# Patient Record
Sex: FEMALE | Race: WHITE | NOT HISPANIC OR LATINO | URBAN - METROPOLITAN AREA
[De-identification: names, ages, dates, MRNs, and addresses within clinical notes are randomized per-mention and may not be internally consistent; named-entity substitution may affect disease eponyms.]

---

## 2022-02-01 ENCOUNTER — OFFICE VISIT (OUTPATIENT)
Dept: URBAN - METROPOLITAN AREA CLINIC 68 | Facility: CLINIC | Age: 77
End: 2022-02-01

## 2022-06-04 ENCOUNTER — TELEPHONE ENCOUNTER (OUTPATIENT)
Dept: URBAN - METROPOLITAN AREA CLINIC 68 | Facility: CLINIC | Age: 77
End: 2022-06-04

## 2022-06-04 RX ORDER — GABAPENTIN 100 MG/1
NEURONTIN(    AS DIRECTED ) INACTIVE -HX ENTRY CAPSULE ORAL AS DIRECTED
OUTPATIENT
Start: 2008-03-18

## 2022-06-04 RX ORDER — AMITRIPTYLINE HYDROCHLORIDE 25 MG/1
AMITRIPTYLINE HCL(    QHS ) INACTIVE -HX ENTRY TABLET, FILM COATED ORAL QHS
OUTPATIENT
Start: 2008-04-03

## 2022-06-04 RX ORDER — GABAPENTIN 100 MG/1
NEURONTIN(    AS DIRECTED ) INACTIVE -HX ENTRY CAPSULE ORAL AS DIRECTED
OUTPATIENT
Start: 2008-04-16

## 2022-06-04 RX ORDER — GABAPENTIN 300 MG/1
CAPSULE ORAL AS DIRECTED
OUTPATIENT
Start: 2011-08-16 | End: 2018-03-16

## 2022-06-04 RX ORDER — GABAPENTIN 600 MG/1
NEURONTIN(    ONE TABLET AT BEDTIME ) INACTIVE -HX ENTRY TABLET, FILM COATED ORAL
OUTPATIENT
Start: 2009-03-31

## 2022-06-04 RX ORDER — CYCLOSPORINE 0.5 MG/ML
EMULSION OPHTHALMIC AS DIRECTED
OUTPATIENT
Start: 2009-12-15 | End: 2018-03-16

## 2022-06-04 RX ORDER — CLONAZEPAM 0.5 MG/1
KLONOPIN(    1/2 BID ) INACTIVE -HX ENTRY TABLET ORAL
OUTPATIENT
Start: 2005-03-16

## 2022-06-04 RX ORDER — GABAPENTIN 300 MG/1
NEURONTIN(    AS DIRECTED ) INACTIVE -HX ENTRY CAPSULE ORAL AS DIRECTED
OUTPATIENT
Start: 2009-12-15

## 2022-06-04 RX ORDER — CLONAZEPAM 0.5 MG/1
KLONOPIN(    1 AT BEDTIME ) INACTIVE -HX ENTRY TABLET ORAL
OUTPATIENT
Start: 2009-03-31

## 2022-06-04 RX ORDER — HYDROCORTISONE ACETATE 25 MG/1
SUPPOSITORY RECTAL
Qty: 28 | Refills: 0 | OUTPATIENT
Start: 2015-08-07 | End: 2015-08-17

## 2022-06-04 RX ORDER — SODIUM PICOSULFATE, MAGNESIUM OXIDE, AND ANHYDROUS CITRIC ACID 10; 3.5; 12 MG/16.2G; G/16.2G; G/16.2G
POWDER, METERED ORAL
Qty: 2 | Refills: 0 | OUTPATIENT
Start: 2016-09-01 | End: 2016-09-02

## 2022-06-04 RX ORDER — HYOSCYAMINE SULFATE 0.12 MG/1
TABLET ORAL
Qty: 60 | Refills: 60 | OUTPATIENT
Start: 2016-09-01 | End: 2018-03-16

## 2022-06-04 RX ORDER — POLYETHYLENE GLYCOL 3350, SODIUM SULFATE, SODIUM CHLORIDE, POTASSIUM CHLORIDE, ASCORBIC ACID, SODIUM ASCORBATE 7.5-2.691G
KIT ORAL
Qty: 1 | Refills: 0 | OUTPATIENT
Start: 2013-12-27 | End: 2018-03-16

## 2022-06-04 RX ORDER — CLOTRIMAZOLE 1 G/100G
CREAM TOPICAL
Qty: 1 | Refills: 0 | OUTPATIENT
Start: 2018-03-16 | End: 2018-03-26

## 2022-06-04 RX ORDER — DICYCLOMINE HYDROCHLORIDE 10 MG/1
CAPSULE ORAL
Qty: 60 | Refills: 0 | OUTPATIENT
Start: 2013-12-17 | End: 2014-01-01

## 2022-06-04 RX ORDER — BALSALAZIDE DISODIUM 750 MG/1
COLAZAL(    3 CAPSULES 3 TIMES DAILY ) INACTIVE -HX ENTRY CAPSULE ORAL
OUTPATIENT
Start: 2005-08-19

## 2022-06-04 RX ORDER — VENLAFAXINE HCL 37.5 MG
EFFEXOR XR(    1 CAPSULE DAILY WITH FOOD ) INACTIVE -HX ENTRY CAPSULE, EXT RELEASE 24 HR ORAL
OUTPATIENT
Start: 2005-03-16

## 2022-06-04 RX ORDER — HYDROCORTISONE ACETATE AND PRAMOXINE HYDROCHLORIDE 10; 10 MG/G; MG/G
CREAM TOPICAL
OUTPATIENT
Start: 2011-06-03 | End: 2013-02-05

## 2022-06-04 RX ORDER — BALSALAZIDE DISODIUM 750 MG/1
COLAZAL(    3 CAPSULES 3 TIMES DAILY ) INACTIVE -HX ENTRY CAPSULE ORAL
OUTPATIENT
Start: 2005-02-17

## 2022-06-05 ENCOUNTER — TELEPHONE ENCOUNTER (OUTPATIENT)
Dept: URBAN - METROPOLITAN AREA CLINIC 68 | Facility: CLINIC | Age: 77
End: 2022-06-05

## 2022-06-05 RX ORDER — COLESEVELAM HYDROCHLORIDE 625 MG/1
TABLET, FILM COATED ORAL DAILY
Qty: 30 | Refills: 30 | Status: ACTIVE | COMMUNITY
Start: 2018-04-19

## 2022-06-05 RX ORDER — CLONAZEPAM 1 MG/1
TABLET ORAL AS NEEDED
Status: ACTIVE | COMMUNITY
Start: 2009-12-15

## 2022-06-05 RX ORDER — RIFAXIMIN 550 MG/1
TABLET ORAL
Qty: 42 | Refills: 42 | Status: ACTIVE | COMMUNITY
Start: 2018-05-29

## 2022-06-25 ENCOUNTER — TELEPHONE ENCOUNTER (OUTPATIENT)
Age: 77
End: 2022-06-25

## 2022-06-25 RX ORDER — DICYCLOMINE HYDROCHLORIDE 20 MG/2ML
BENTYL(    1 CAPSULE 3 TIMES DAILY ) INACTIVE -HX ENTRY INJECTION, SOLUTION INTRAMUSCULAR
OUTPATIENT
Start: 2005-02-17

## 2022-06-25 RX ORDER — GABAPENTIN 100 MG
NEURONTIN(    AS DIRECTED ) INACTIVE -HX ENTRY CAPSULE ORAL AS DIRECTED
OUTPATIENT
Start: 2008-03-18

## 2022-06-25 RX ORDER — HYDROCORTISONE ACETATE 25 MG/1
SUPPOSITORY RECTAL
Qty: 28 | Refills: 0 | OUTPATIENT
Start: 2015-08-07 | End: 2015-08-17

## 2022-06-25 RX ORDER — DICYCLOMINE HYDROCHLORIDE 10 MG/1
CAPSULE ORAL
Qty: 60 | Refills: 0 | OUTPATIENT
Start: 2013-12-17 | End: 2014-01-01

## 2022-06-25 RX ORDER — CLONAZEPAM 0.5 MG/1
KLONOPIN(    1/2 BID ) INACTIVE -HX ENTRY TABLET ORAL
OUTPATIENT
Start: 2005-03-16

## 2022-06-25 RX ORDER — HYDROCORTISONE ACETATE AND PRAMOXINE HYDROCHLORIDE 25; 10 MG/G; MG/G
ANALPRAM-HC(    APPLY TO RECTUM BID ) INACTIVE -HX ENTRY CREAM TOPICAL
OUTPATIENT
Start: 2008-04-03

## 2022-06-25 RX ORDER — HYDROCORTISONE ACETATE AND PRAMOXINE HYDROCHLORIDE 25; 10 MG/G; MG/G
ANALPRAM-HC(    APPLY TO RECTUM BID ) INACTIVE -HX ENTRY CREAM TOPICAL
OUTPATIENT
Start: 2009-03-31

## 2022-06-25 RX ORDER — MESALAMINE 800 MG/1
ASACOL(    AS DIRECTED ) INACTIVE -HX ENTRY TABLET, DELAYED RELEASE ORAL AS DIRECTED
OUTPATIENT
Start: 2005-02-08

## 2022-06-25 RX ORDER — CLONAZEPAM 0.5 MG/1
KLONOPIN(    1 AT BEDTIME ) INACTIVE -HX ENTRY TABLET ORAL
OUTPATIENT
Start: 2009-03-31

## 2022-06-25 RX ORDER — BALSALAZIDE DISODIUM 750 MG/1
COLAZAL(    3 CAPSULES 3 TIMES DAILY ) INACTIVE -HX ENTRY CAPSULE ORAL
OUTPATIENT
Start: 2005-08-19

## 2022-06-25 RX ORDER — POLYETHYLENE GLYCOL 3350, SODIUM SULFATE, SODIUM CHLORIDE, POTASSIUM CHLORIDE, ASCORBIC ACID, SODIUM ASCORBATE 7.5-2.691G
KIT ORAL
Qty: 1 | Refills: 0 | OUTPATIENT
Start: 2013-12-27 | End: 2018-03-16

## 2022-06-25 RX ORDER — BALSALAZIDE DISODIUM 750 MG/1
COLAZAL(    3 CAPSULES 3 TIMES DAILY ) INACTIVE -HX ENTRY CAPSULE ORAL
OUTPATIENT
Start: 2005-02-17

## 2022-06-25 RX ORDER — DIPHENOXYLATE HCL/ATROPINE 2.5-.025MG
LOMOTIL(    AS DIRECTED ) INACTIVE -HX ENTRY TABLET ORAL AS DIRECTED
OUTPATIENT
Start: 2009-03-31

## 2022-06-25 RX ORDER — DICYCLOMINE HYDROCHLORIDE 20 MG/2ML
BENTYL(    1 PO Q4-6 HOURS PRN PAIN ) INACTIVE -HX ENTRY INJECTION, SOLUTION INTRAMUSCULAR
OUTPATIENT
Start: 2008-04-16

## 2022-06-25 RX ORDER — GABAPENTIN 300 MG
NEURONTIN(    AS DIRECTED ) INACTIVE -HX ENTRY CAPSULE ORAL AS DIRECTED
OUTPATIENT
Start: 2009-12-15

## 2022-06-25 RX ORDER — CYCLOSPORINE 0.5 MG/ML
EMULSION OPHTHALMIC AS DIRECTED
OUTPATIENT
Start: 2009-12-15 | End: 2018-03-16

## 2022-06-25 RX ORDER — DIPHENOXYLATE HCL/ATROPINE 2.5-.025MG
LOMOTIL(    AS NEEDED ) INACTIVE -HX ENTRY TABLET ORAL AS NEEDED
OUTPATIENT
Start: 2005-03-16

## 2022-06-25 RX ORDER — SODIUM SULFATE, POTASSIUM SULFATE, MAGNESIUM SULFATE 17.5; 3.13; 1.6 G/ML; G/ML; G/ML
SOLUTION, CONCENTRATE ORAL AS DIRECTED
Qty: 1 | Refills: 0 | OUTPATIENT
Start: 2016-09-01 | End: 2016-09-02

## 2022-06-25 RX ORDER — SODIUM SULFATE, POTASSIUM SULFATE, MAGNESIUM SULFATE 17.5; 3.13; 1.6 G/ML; G/ML; G/ML
SUPREP BOWEL PREP KIT(    AS DIRECTED ) INACTIVE -HX ENTRY SOLUTION, CONCENTRATE ORAL AS DIRECTED
OUTPATIENT
Start: 2011-08-16

## 2022-06-25 RX ORDER — AMITRIPTYLINE HYDROCHLORIDE 25 MG/1
AMITRIPTYLINE HCL(    QHS ) INACTIVE -HX ENTRY TABLET, FILM COATED ORAL QHS
OUTPATIENT
Start: 2008-04-03

## 2022-06-25 RX ORDER — PRAMOXINE HYDROCHLORIDE HYDROCORTISONE ACETATE 100; 100 MG/10G; MG/10G
AEROSOL, FOAM TOPICAL
Qty: 28 | Refills: 28 | OUTPATIENT
Start: 2017-01-27 | End: 2018-03-16

## 2022-06-25 RX ORDER — GABAPENTIN 300 MG
CAPSULE ORAL AS DIRECTED
OUTPATIENT
Start: 2011-08-16 | End: 2018-03-16

## 2022-06-25 RX ORDER — HYOSCYAMINE SULFATE 0.12 MG/1
TABLET ORAL
Qty: 60 | Refills: 60 | OUTPATIENT
Start: 2016-09-01 | End: 2018-03-16

## 2022-06-25 RX ORDER — MESALAMINE 800 MG/1
ASACOL(    2 TABLETS 3 TIMES DAILY ) INACTIVE -HX ENTRY TABLET, DELAYED RELEASE ORAL
OUTPATIENT
Start: 2005-06-20

## 2022-06-25 RX ORDER — HYDROCORTISONE ACETATE 25 MG/1
ANUSOL HC-1(    APPLY TO RECTUM TID ) INACTIVE -HX ENTRY SUPPOSITORY RECTAL
OUTPATIENT
Start: 2005-04-18

## 2022-06-25 RX ORDER — DIPHENOXYLATE HCL/ATROPINE 2.5-.025MG
LOMOTIL(    AS NEEDED ) INACTIVE -HX ENTRY TABLET ORAL AS NEEDED
OUTPATIENT
Start: 2005-02-08

## 2022-06-25 RX ORDER — HYDROCORTISONE ACETATE AND PRAMOXINE HYDROCHLORIDE 10; 10 MG/G; MG/G
CREAM TOPICAL
OUTPATIENT
Start: 2011-06-03 | End: 2013-02-05

## 2022-06-25 RX ORDER — DICYCLOMINE HYDROCHLORIDE 20 MG/2ML
BENTYL(    AS DIRECTED ) INACTIVE -HX ENTRY INJECTION, SOLUTION INTRAMUSCULAR AS DIRECTED
OUTPATIENT
Start: 2005-02-08

## 2022-06-25 RX ORDER — SODIUM SULFATE, POTASSIUM SULFATE, MAGNESIUM SULFATE 17.5; 3.13; 1.6 G/ML; G/ML; G/ML
SOLUTION, CONCENTRATE ORAL AS DIRECTED
Qty: 1 | Refills: 0 | OUTPATIENT
Start: 2017-01-03 | End: 2017-01-04

## 2022-06-25 RX ORDER — VENLAFAXINE HCL 37.5 MG
EFFEXOR XR(    1 CAPSULE DAILY WITH FOOD ) INACTIVE -HX ENTRY CAPSULE, EXT RELEASE 24 HR ORAL
OUTPATIENT
Start: 2005-03-16

## 2022-06-25 RX ORDER — HYDROCORTISONE ACETATE AND PRAMOXINE HYDROCHLORIDE 25; 10 MG/G; MG/G
CREAM TOPICAL
Qty: 120 | Refills: 120 | OUTPATIENT
Start: 2017-02-06 | End: 2018-03-16

## 2022-06-25 RX ORDER — GABAPENTIN 600 MG
NEURONTIN(    ONE TABLET AT BEDTIME ) INACTIVE -HX ENTRY TABLET ORAL
OUTPATIENT
Start: 2009-03-31

## 2022-06-25 RX ORDER — CLOTRIMAZOLE 10 MG/G
CREAM TOPICAL
Qty: 1 | Refills: 0 | OUTPATIENT
Start: 2018-03-16 | End: 2018-03-26

## 2022-06-25 RX ORDER — METRONIDAZOLE 375 MG/1
CAPSULE ORAL
Qty: 30 | Refills: 0 | OUTPATIENT
Start: 2018-03-16 | End: 2018-03-26

## 2022-06-25 RX ORDER — GABAPENTIN 100 MG
NEURONTIN(    AS DIRECTED ) INACTIVE -HX ENTRY CAPSULE ORAL AS DIRECTED
OUTPATIENT
Start: 2008-04-16

## 2022-06-26 ENCOUNTER — TELEPHONE ENCOUNTER (OUTPATIENT)
Age: 77
End: 2022-06-26

## 2022-06-26 RX ORDER — RIFAXIMIN 550 MG/1
TABLET ORAL
Qty: 42 | Refills: 42 | Status: ACTIVE | COMMUNITY
Start: 2018-05-29

## 2022-06-26 RX ORDER — COLESEVELAM HYDROCHLORIDE 625 MG/1
TABLET, FILM COATED ORAL DAILY
Qty: 30 | Refills: 30 | Status: ACTIVE | COMMUNITY
Start: 2018-04-19

## 2022-06-26 RX ORDER — CLONAZEPAM 1 MG/1
TABLET ORAL AS NEEDED
Status: ACTIVE | COMMUNITY
Start: 2009-12-15
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
## 2023-07-27 ENCOUNTER — OFFICE VISIT (OUTPATIENT)
Dept: PRIMARY CARE | Facility: CLINIC | Age: 78
End: 2023-07-27
Payer: MEDICARE

## 2023-07-27 VITALS — TEMPERATURE: 97.9 F | DIASTOLIC BLOOD PRESSURE: 79 MMHG | SYSTOLIC BLOOD PRESSURE: 140 MMHG | HEART RATE: 71 BPM

## 2023-07-27 DIAGNOSIS — F10.21 HISTORY OF ALCOHOLISM (MULTI): ICD-10-CM

## 2023-07-27 DIAGNOSIS — Z12.31 ENCOUNTER FOR SCREENING MAMMOGRAM FOR MALIGNANT NEOPLASM OF BREAST: ICD-10-CM

## 2023-07-27 DIAGNOSIS — Z85.820 H/O MALIGNANT MELANOMA: Primary | ICD-10-CM

## 2023-07-27 DIAGNOSIS — K51.919 ULCERATIVE COLITIS WITH COMPLICATION, UNSPECIFIED LOCATION (MULTI): ICD-10-CM

## 2023-07-27 DIAGNOSIS — F33.1 MODERATE EPISODE OF RECURRENT MAJOR DEPRESSIVE DISORDER (MULTI): ICD-10-CM

## 2023-07-27 DIAGNOSIS — M81.0 OSTEOPOROSIS, UNSPECIFIED OSTEOPOROSIS TYPE, UNSPECIFIED PATHOLOGICAL FRACTURE PRESENCE: ICD-10-CM

## 2023-07-27 DIAGNOSIS — F41.9 ANXIETY: ICD-10-CM

## 2023-07-27 DIAGNOSIS — M79.7 FIBROMYALGIA: ICD-10-CM

## 2023-07-27 PROBLEM — F33.9 MAJOR DEPRESSIVE DISORDER, RECURRENT (CMS-HCC): Status: ACTIVE | Noted: 2023-01-13

## 2023-07-27 PROBLEM — K51.90 ULCERATIVE COLITIS (MULTI): Status: ACTIVE | Noted: 2023-01-13

## 2023-07-27 PROBLEM — E78.00 PURE HYPERCHOLESTEROLEMIA: Status: ACTIVE | Noted: 2023-07-27

## 2023-07-27 PROBLEM — F51.04 CHRONIC INSOMNIA: Status: ACTIVE | Noted: 2023-01-13

## 2023-07-27 PROCEDURE — 99204 OFFICE O/P NEW MOD 45 MIN: CPT | Performed by: INTERNAL MEDICINE

## 2023-07-27 PROCEDURE — 1159F MED LIST DOCD IN RCRD: CPT | Performed by: INTERNAL MEDICINE

## 2023-07-27 PROCEDURE — 1160F RVW MEDS BY RX/DR IN RCRD: CPT | Performed by: INTERNAL MEDICINE

## 2023-07-27 PROCEDURE — 1036F TOBACCO NON-USER: CPT | Performed by: INTERNAL MEDICINE

## 2023-07-27 RX ORDER — GABAPENTIN 100 MG/1
100 CAPSULE ORAL 3 TIMES DAILY
COMMUNITY
Start: 2023-01-13 | End: 2023-11-21 | Stop reason: SDUPTHER

## 2023-07-27 RX ORDER — CLONAZEPAM 0.5 MG/1
0.5 TABLET ORAL 3 TIMES DAILY
Qty: 60 TABLET | Refills: 0 | Status: SHIPPED | OUTPATIENT
Start: 2023-07-27 | End: 2023-08-17 | Stop reason: SDUPTHER

## 2023-07-27 RX ORDER — DICYCLOMINE HYDROCHLORIDE 10 MG/1
10 CAPSULE ORAL 4 TIMES DAILY
COMMUNITY
End: 2024-02-28 | Stop reason: SDUPTHER

## 2023-07-27 RX ORDER — CLONAZEPAM 0.5 MG/1
0.5 TABLET ORAL
COMMUNITY
Start: 2022-10-11 | End: 2023-07-27 | Stop reason: SDUPTHER

## 2023-07-27 ASSESSMENT — PATIENT HEALTH QUESTIONNAIRE - PHQ9
4. FEELING TIRED OR HAVING LITTLE ENERGY: NEARLY EVERY DAY
8. MOVING OR SPEAKING SO SLOWLY THAT OTHER PEOPLE COULD HAVE NOTICED. OR THE OPPOSITE, BEING SO FIGETY OR RESTLESS THAT YOU HAVE BEEN MOVING AROUND A LOT MORE THAN USUAL: NOT AT ALL
6. FEELING BAD ABOUT YOURSELF - OR THAT YOU ARE A FAILURE OR HAVE LET YOURSELF OR YOUR FAMILY DOWN: NOT AT ALL
7. TROUBLE CONCENTRATING ON THINGS, SUCH AS READING THE NEWSPAPER OR WATCHING TELEVISION: SEVERAL DAYS
SUM OF ALL RESPONSES TO PHQ QUESTIONS 1-9: 14
2. FEELING DOWN, DEPRESSED OR HOPELESS: NEARLY EVERY DAY
9. THOUGHTS THAT YOU WOULD BE BETTER OFF DEAD, OR OF HURTING YOURSELF: NOT AT ALL
5. POOR APPETITE OR OVEREATING: SEVERAL DAYS
10. IF YOU CHECKED OFF ANY PROBLEMS, HOW DIFFICULT HAVE THESE PROBLEMS MADE IT FOR YOU TO DO YOUR WORK, TAKE CARE OF THINGS AT HOME, OR GET ALONG WITH OTHER PEOPLE: NOT DIFFICULT AT ALL
3. TROUBLE FALLING OR STAYING ASLEEP: NEARLY EVERY DAY
1. LITTLE INTEREST OR PLEASURE IN DOING THINGS: NEARLY EVERY DAY
SUM OF ALL RESPONSES TO PHQ9 QUESTIONS 1 & 2: 6

## 2023-07-27 NOTE — ASSESSMENT & PLAN NOTE
Significant, multiple medication side effects complicated by adjustment disorder, social, monetary and traumatic stressors. Interested in collaborative approach in management, will refer to psychiatry and [facility] for additional assistance.

## 2023-07-27 NOTE — PATIENT INSTRUCTIONS
It was a pleasure to see you today! Here is a list of things we have discussed and to follow up on:   I have sent a refill to your pharmacy   We are referring you to both behavioral health and psychiatry   Referral to gastroenterology for management of ulcerative colitis   Mammogram - Call [phone] or stop by the 4th floor ([address]) at the [facility] to have this scheduled.   Followup in 3 months

## 2023-07-28 NOTE — ASSESSMENT & PLAN NOTE
Sober x 20 years, no significant support system at present. Will discuss in greater detail at next visit.

## 2023-07-28 NOTE — ASSESSMENT & PLAN NOTE
With multiple risk factors for falls and fractures. Significant issues with her teeth and they are pending extraction. Hold off on further management for now until these are addressed. Consider repeat bone density in future visit.

## 2023-07-28 NOTE — ASSESSMENT & PLAN NOTE
With intermittent panic, currently maintained on chronic TID dosing of clonazepam. Discussed concerns regarding continued management with benzo and recommendations for better management. For now will await psychiatry recommendations given multiple intolerances to medication with significant history. Potential side effects including falls, confusion, dependence and overdose reviewed. This patient is taking a medication with addiction potential.   We have determined that this medication is beneficial to the patient for the time being.   They have signed a contract today, 7/27.   This will be renewed yearly as long as on the meds.   PDMP will be run with refills every 90 days.   Patient will be checked with random urine for drug screen and understands that this is mandatory and any issues with other medications that are not prescribed or illegal will mean that we will no longer provide the medications   they will need to be seen every 3 months to monitor and assess the need of the medication   I have considered the risks of abuse, dependence, addiction and diversion. I believe that it is clinically appropriate for this patient to be prescribed this medication.  Urine drug screen  to be performed today

## 2023-07-28 NOTE — ASSESSMENT & PLAN NOTE
Clinically asymptomatic, previously seen by GI in [place], interested in establishing care. Will refer.

## 2023-07-28 NOTE — ASSESSMENT & PLAN NOTE
Complex history with fibromyalgia and chronic neuropathy. To take gabapentin only 1 tab at night only, not to be used concurrently with clonazepam. Monitor for significant side effects. No response to low impact exercises, intolerant to effexor and cymbalta per past history.

## 2023-08-14 ENCOUNTER — TELEPHONE (OUTPATIENT)
Dept: PRIMARY CARE | Facility: CLINIC | Age: 78
End: 2023-08-14

## 2023-08-14 DIAGNOSIS — F41.9 ANXIETY: ICD-10-CM

## 2023-08-14 RX ORDER — CLONAZEPAM 0.5 MG/1
0.5 TABLET ORAL 3 TIMES DAILY
Qty: 60 TABLET | Refills: 0 | OUTPATIENT
Start: 2023-08-14 | End: 2023-09-03

## 2023-08-17 DIAGNOSIS — F41.9 ANXIETY: ICD-10-CM

## 2023-08-17 RX ORDER — CLONAZEPAM 0.5 MG/1
0.5 TABLET ORAL 3 TIMES DAILY
Qty: 60 TABLET | Refills: 0 | OUTPATIENT
Start: 2023-08-17

## 2023-08-17 RX ORDER — CLONAZEPAM 0.5 MG/1
0.5 TABLET ORAL 3 TIMES DAILY
Qty: 90 TABLET | Refills: 0 | Status: SHIPPED | OUTPATIENT
Start: 2023-08-17 | End: 2023-09-14

## 2023-08-17 NOTE — TELEPHONE ENCOUNTER
Patient called in stating that she has left several messages and reached out via [employer] for a refill on her ClonazePAM. Her pharmacy says that they want to verify the quantity and dosage with the office as well. She says that she has been out of the rx for 24 hours and is feeling the effects of not having it. Requesting a refill be sent in today.

## 2023-09-14 DIAGNOSIS — F41.9 ANXIETY: Primary | ICD-10-CM

## 2023-09-14 DIAGNOSIS — Z03.89 ENCOUNTER FOR OBSERVATION FOR OTHER SUSPECTED DISEASES AND CONDITIONS RULED OUT: ICD-10-CM

## 2023-09-14 DIAGNOSIS — F41.9 ANXIETY: ICD-10-CM

## 2023-09-14 RX ORDER — CLONAZEPAM 0.5 MG/1
TABLET ORAL
Qty: 90 TABLET | Refills: 0 | Status: SHIPPED | OUTPATIENT
Start: 2023-09-14 | End: 2023-10-12 | Stop reason: SDUPTHER

## 2023-10-12 DIAGNOSIS — F41.9 ANXIETY: ICD-10-CM

## 2023-10-12 RX ORDER — CLONAZEPAM 0.5 MG/1
0.5 TABLET ORAL 3 TIMES DAILY
Qty: 90 TABLET | Refills: 0 | Status: SHIPPED | OUTPATIENT
Start: 2023-10-12 | End: 2023-11-14 | Stop reason: SDUPTHER

## 2023-11-08 ENCOUNTER — APPOINTMENT (OUTPATIENT)
Dept: PRIMARY CARE | Facility: CLINIC | Age: 78
End: 2023-11-08
Payer: MEDICARE

## 2023-11-14 DIAGNOSIS — F41.9 ANXIETY: ICD-10-CM

## 2023-11-14 RX ORDER — CLONAZEPAM 0.5 MG/1
0.5 TABLET ORAL 3 TIMES DAILY
Qty: 90 TABLET | Refills: 0 | Status: SHIPPED | OUTPATIENT
Start: 2023-11-14 | End: 2023-12-18 | Stop reason: SDUPTHER

## 2023-11-14 NOTE — TELEPHONE ENCOUNTER
----- Message from Elida Yoon sent at 11/13/2023  6:52 PM EST -----  Regarding: Phyllis Yoon - Urgent  Contact: [phone]  It is time for my monthly Clonazepam refill, I have enough medication for only one more day.  I tried to leave a few messages earlier.      Please take care of this ASAP.  My pharmacy is [facility] located in [place] on the corner of [address] [address].      In addition, I am on the wait list for an appointment to see you and I am certain that will work out shortly.    Please respond to this email.  Thank you very much!    Phyllis Yoon

## 2023-11-21 ENCOUNTER — OFFICE VISIT (OUTPATIENT)
Dept: PRIMARY CARE | Facility: CLINIC | Age: 78
End: 2023-11-21
Payer: MEDICARE

## 2023-11-21 ENCOUNTER — DOCUMENTATION (OUTPATIENT)
Dept: PRIMARY CARE | Facility: CLINIC | Age: 78
End: 2023-11-21

## 2023-11-21 ENCOUNTER — LAB (OUTPATIENT)
Dept: LAB | Facility: LAB | Age: 78
End: 2023-11-21
Payer: MEDICARE

## 2023-11-21 VITALS
SYSTOLIC BLOOD PRESSURE: 144 MMHG | BODY MASS INDEX: 19.83 KG/M2 | OXYGEN SATURATION: 97 % | HEIGHT: 65 IN | WEIGHT: 119 LBS | HEART RATE: 74 BPM | TEMPERATURE: 97.5 F | DIASTOLIC BLOOD PRESSURE: 84 MMHG

## 2023-11-21 DIAGNOSIS — R03.0 ELEVATED BLOOD PRESSURE READING: ICD-10-CM

## 2023-11-21 DIAGNOSIS — G43.109 MIGRAINE WITH AURA AND WITHOUT STATUS MIGRAINOSUS, NOT INTRACTABLE: ICD-10-CM

## 2023-11-21 DIAGNOSIS — R30.0 DYSURIA: ICD-10-CM

## 2023-11-21 DIAGNOSIS — Z00.00 ENCOUNTER FOR PREVENTATIVE ADULT HEALTH CARE EXAMINATION: ICD-10-CM

## 2023-11-21 DIAGNOSIS — F41.9 ANXIETY: ICD-10-CM

## 2023-11-21 DIAGNOSIS — M81.0 OSTEOPOROSIS, UNSPECIFIED OSTEOPOROSIS TYPE, UNSPECIFIED PATHOLOGICAL FRACTURE PRESENCE: ICD-10-CM

## 2023-11-21 DIAGNOSIS — E46 PROTEIN-CALORIE MALNUTRITION, UNSPECIFIED SEVERITY (MULTI): ICD-10-CM

## 2023-11-21 DIAGNOSIS — Z59.41 FOOD INSECURITY: Primary | ICD-10-CM

## 2023-11-21 DIAGNOSIS — M79.7 FIBROMYALGIA: ICD-10-CM

## 2023-11-21 DIAGNOSIS — J32.9 SINUSITIS, UNSPECIFIED CHRONICITY, UNSPECIFIED LOCATION: ICD-10-CM

## 2023-11-21 DIAGNOSIS — F33.9 RECURRENT MAJOR DEPRESSIVE DISORDER, REMISSION STATUS UNSPECIFIED (CMS-HCC): ICD-10-CM

## 2023-11-21 DIAGNOSIS — Z03.89 ENCOUNTER FOR OBSERVATION FOR OTHER SUSPECTED DISEASES AND CONDITIONS RULED OUT: ICD-10-CM

## 2023-11-21 LAB
25(OH)D3 SERPL-MCNC: 15 NG/ML (ref 30–100)
ALBUMIN SERPL BCP-MCNC: 4.9 G/DL (ref 3.4–5)
ALP SERPL-CCNC: 84 U/L (ref 33–136)
ALT SERPL W P-5'-P-CCNC: 13 U/L (ref 7–45)
ANION GAP SERPL CALC-SCNC: 17 MMOL/L (ref 10–20)
AST SERPL W P-5'-P-CCNC: 20 U/L (ref 9–39)
BASOPHILS # BLD AUTO: 0.08 X10*3/UL (ref 0–0.1)
BASOPHILS NFR BLD AUTO: 0.8 %
BILIRUB SERPL-MCNC: 0.9 MG/DL (ref 0–1.2)
BUN SERPL-MCNC: 17 MG/DL (ref 6–23)
CALCIUM SERPL-MCNC: 10.4 MG/DL (ref 8.6–10.6)
CHLORIDE SERPL-SCNC: 102 MMOL/L (ref 98–107)
CHOLEST SERPL-MCNC: 327 MG/DL (ref 0–199)
CHOLESTEROL/HDL RATIO: 3.6
CO2 SERPL-SCNC: 29 MMOL/L (ref 21–32)
CREAT SERPL-MCNC: 0.84 MG/DL (ref 0.5–1.05)
EOSINOPHIL # BLD AUTO: 0.08 X10*3/UL (ref 0–0.4)
EOSINOPHIL NFR BLD AUTO: 0.8 %
ERYTHROCYTE [DISTWIDTH] IN BLOOD BY AUTOMATED COUNT: 13.4 % (ref 11.5–14.5)
GFR SERPL CREATININE-BSD FRML MDRD: 72 ML/MIN/1.73M*2
GLUCOSE SERPL-MCNC: 97 MG/DL (ref 74–99)
HCT VFR BLD AUTO: 46.7 % (ref 36–46)
HDLC SERPL-MCNC: 89.6 MG/DL
HGB BLD-MCNC: 14.5 G/DL (ref 12–16)
IMM GRANULOCYTES # BLD AUTO: 0.03 X10*3/UL (ref 0–0.5)
IMM GRANULOCYTES NFR BLD AUTO: 0.3 % (ref 0–0.9)
LDLC SERPL CALC-MCNC: 211 MG/DL
LYMPHOCYTES # BLD AUTO: 2.87 X10*3/UL (ref 0.8–3)
LYMPHOCYTES NFR BLD AUTO: 27.9 %
MCH RBC QN AUTO: 28.7 PG (ref 26–34)
MCHC RBC AUTO-ENTMCNC: 31 G/DL (ref 32–36)
MCV RBC AUTO: 93 FL (ref 80–100)
MONOCYTES # BLD AUTO: 0.54 X10*3/UL (ref 0.05–0.8)
MONOCYTES NFR BLD AUTO: 5.2 %
NEUTROPHILS # BLD AUTO: 6.7 X10*3/UL (ref 1.6–5.5)
NEUTROPHILS NFR BLD AUTO: 65 %
NON HDL CHOLESTEROL: 237 MG/DL (ref 0–149)
NRBC BLD-RTO: 0 /100 WBCS (ref 0–0)
PLATELET # BLD AUTO: 338 X10*3/UL (ref 150–450)
POTASSIUM SERPL-SCNC: 4.9 MMOL/L (ref 3.5–5.3)
PROT SERPL-MCNC: 7.4 G/DL (ref 6.4–8.2)
RBC # BLD AUTO: 5.05 X10*6/UL (ref 4–5.2)
SODIUM SERPL-SCNC: 143 MMOL/L (ref 136–145)
T4 FREE SERPL-MCNC: 0.91 NG/DL (ref 0.78–1.48)
TRIGL SERPL-MCNC: 132 MG/DL (ref 0–149)
TSH SERPL-ACNC: 6.03 MIU/L (ref 0.44–3.98)
VLDL: 26 MG/DL (ref 0–40)
WBC # BLD AUTO: 10.3 X10*3/UL (ref 4.4–11.3)

## 2023-11-21 PROCEDURE — 1159F MED LIST DOCD IN RCRD: CPT | Performed by: INTERNAL MEDICINE

## 2023-11-21 PROCEDURE — 99215 OFFICE O/P EST HI 40 MIN: CPT | Performed by: INTERNAL MEDICINE

## 2023-11-21 PROCEDURE — 80061 LIPID PANEL: CPT

## 2023-11-21 PROCEDURE — 36415 COLL VENOUS BLD VENIPUNCTURE: CPT

## 2023-11-21 PROCEDURE — 1160F RVW MEDS BY RX/DR IN RCRD: CPT | Performed by: INTERNAL MEDICINE

## 2023-11-21 PROCEDURE — 82306 VITAMIN D 25 HYDROXY: CPT

## 2023-11-21 PROCEDURE — 84443 ASSAY THYROID STIM HORMONE: CPT

## 2023-11-21 PROCEDURE — 1036F TOBACCO NON-USER: CPT | Performed by: INTERNAL MEDICINE

## 2023-11-21 PROCEDURE — 80053 COMPREHEN METABOLIC PANEL: CPT

## 2023-11-21 PROCEDURE — 84439 ASSAY OF FREE THYROXINE: CPT

## 2023-11-21 PROCEDURE — 85025 COMPLETE CBC W/AUTO DIFF WBC: CPT

## 2023-11-21 RX ORDER — FLUTICASONE PROPIONATE 50 MCG
1 SPRAY, SUSPENSION (ML) NASAL DAILY
Qty: 16 G | Refills: 11 | Status: SHIPPED | OUTPATIENT
Start: 2023-11-21 | End: 2024-11-20

## 2023-11-21 RX ORDER — NITROFURANTOIN 25; 75 MG/1; MG/1
100 CAPSULE ORAL 2 TIMES DAILY
Qty: 10 CAPSULE | Refills: 0 | Status: SHIPPED | OUTPATIENT
Start: 2023-11-21 | End: 2023-11-26

## 2023-11-21 RX ORDER — GABAPENTIN 100 MG/1
100 CAPSULE ORAL NIGHTLY
Qty: 90 CAPSULE | Refills: 1 | Status: SHIPPED | OUTPATIENT
Start: 2023-11-21

## 2023-11-21 RX ORDER — SUMATRIPTAN 50 MG/1
50 TABLET, FILM COATED ORAL ONCE AS NEEDED
Qty: 27 TABLET | Refills: 3 | Status: SHIPPED | OUTPATIENT
Start: 2023-11-21 | End: 2024-02-28

## 2023-11-21 SDOH — ECONOMIC STABILITY - FOOD INSECURITY: FOOD INSECURITY: Z59.41
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
## 2023-11-21 NOTE — PATIENT INSTRUCTIONS
Elida,   Vaccines - High dose flu shot, COVID booster and RSV vaccinations at the pharmacy.   For urinary symptoms - check urine studies, start MACROBID 1 tablet twice a day for 5 days   For headaches - start SUMATRIPTAN. Take 1 at onset of headache, can take one additional pill 2 hours later IF NEEDED. No more than 2 tablets per day.   For fibromyalgia, insomnia and headaches - Start gabapentin 1 tablet per night   I have ordered blood and/or urine tests for you to do today. The lab can be found on this floor (2nd floor) next to the pharmacy across from the elevators.    Lauren will be reaching out to you to assist with food issues.   Followup with [facility] for behavioral services - I have provided you with a list of resources as well.   Followup 3 months

## 2023-11-21 NOTE — ASSESSMENT & PLAN NOTE
Recent flares attributed to chronic sinus and recent dental work without additional alarm features.   Declines brain imaging at present.   Will retreat with triptan. Counseling provided on appropriate administration.

## 2023-11-22 ENCOUNTER — PATIENT OUTREACH (OUTPATIENT)
Dept: PRIMARY CARE | Facility: CLINIC | Age: 78
End: 2023-11-22
Payer: MEDICARE

## 2023-11-22 DIAGNOSIS — E55.9 VITAMIN D DEFICIENCY: Primary | ICD-10-CM

## 2023-11-22 RX ORDER — ERGOCALCIFEROL 1.25 MG/1
50000 CAPSULE ORAL
Qty: 12 CAPSULE | Refills: 0 | Status: SHIPPED | OUTPATIENT
Start: 2023-11-22 | End: 2024-02-14

## 2023-11-27 ENCOUNTER — DOCUMENTATION (OUTPATIENT)
Dept: PRIMARY CARE | Facility: CLINIC | Age: 78
End: 2023-11-27
Payer: MEDICARE

## 2023-11-27 DIAGNOSIS — F41.9 ANXIETY: ICD-10-CM

## 2023-11-27 DIAGNOSIS — F33.9 RECURRENT MAJOR DEPRESSIVE DISORDER, REMISSION STATUS UNSPECIFIED (CMS-HCC): ICD-10-CM

## 2023-11-27 DIAGNOSIS — Z59.41 FOOD INSECURITY: ICD-10-CM

## 2023-11-27 PROCEDURE — 99490 CHRNC CARE MGMT STAFF 1ST 20: CPT | Performed by: INTERNAL MEDICINE

## 2023-11-27 PROCEDURE — 99439 CHRNC CARE MGMT STAF EA ADDL: CPT | Performed by: INTERNAL MEDICINE

## 2023-11-27 SDOH — ECONOMIC STABILITY - FOOD INSECURITY: FOOD INSECURITY: Z59.41

## 2023-11-27 ASSESSMENT — ENCOUNTER SYMPTOMS
OCCASIONAL FEELINGS OF UNSTEADINESS: 0
LOSS OF SENSATION IN FEET: 0
DEPRESSION: 0

## 2023-11-27 NOTE — PROGRESS NOTES
CCM outreach with pt identified by name and , CCM program reviewed with pt and verbal consent for enrollment.    LOV: 23  NOV: not scheduled    Health MaintenanceDue: MAWV, Bone Desity Scan    Goals for Depression:  Monitor mood and behavior changes.  Administer prescribed antidepressant medication.  Facilitate regular psychotherapy sessions.  Provide education on depression management.  Stick to a daily schedule  Call support group or person   Keep a healthy weight and eat healthy diet options    For Cholesterol:   Treatment goals include:  HIGHER IN FRUITS AND VEGGIES AND WHOLE GRAIN AND LOWER IN FATTY FOODS.     Reviewed warning s/s and advised to seek immediate medical attention if he develops discussed warning sx.    Spoke with pt after referral by PCP, pt agrees to [facility] program.  Pt states she lives alone and has been dealing with some financial challenges but has been getting through it.  Pt is in contact with [facility] [employer] and receives support.  Pt states she battles with depression and we spoke of things to get through.  Pt states she is up independent and does not use any assistive devices.  Pt still drives and does her own shopping and cooking.   Spoke with pt about good nutrition and adequate food intake.      Medications reviewed no refills requested.  Pt verbalizes understanding and is in agreement with POC.  Pt given my direct contact number and encouraged to reach out with any healthcare needs or any changes in her mood.

## 2023-12-15 DIAGNOSIS — F41.9 ANXIETY: ICD-10-CM

## 2023-12-15 RX ORDER — CLONAZEPAM 0.5 MG/1
0.5 TABLET ORAL 3 TIMES DAILY
Qty: 90 TABLET | Refills: 0 | Status: CANCELLED | OUTPATIENT
Start: 2023-12-15 | End: 2024-03-14

## 2023-12-18 DIAGNOSIS — F41.9 ANXIETY: ICD-10-CM

## 2023-12-18 DIAGNOSIS — Z79.899 CHRONICALLY ON BENZODIAZEPINE THERAPY: ICD-10-CM

## 2023-12-18 RX ORDER — CLONAZEPAM 0.5 MG/1
0.5 TABLET ORAL 3 TIMES DAILY
Qty: 90 TABLET | Refills: 0 | Status: SHIPPED | OUTPATIENT
Start: 2023-12-18 | End: 2024-01-15 | Stop reason: SDUPTHER

## 2023-12-20 ENCOUNTER — LAB (OUTPATIENT)
Dept: LAB | Facility: LAB | Age: 78
End: 2023-12-20
Payer: MEDICARE

## 2023-12-20 ENCOUNTER — DOCUMENTATION (OUTPATIENT)
Dept: PRIMARY CARE | Facility: CLINIC | Age: 78
End: 2023-12-20

## 2023-12-20 DIAGNOSIS — R30.0 DYSURIA: Primary | ICD-10-CM

## 2023-12-20 DIAGNOSIS — F33.9 RECURRENT MAJOR DEPRESSIVE DISORDER, REMISSION STATUS UNSPECIFIED (CMS-HCC): ICD-10-CM

## 2023-12-20 DIAGNOSIS — Z79.899 CHRONICALLY ON BENZODIAZEPINE THERAPY: ICD-10-CM

## 2023-12-20 DIAGNOSIS — M81.0 OSTEOPOROSIS, UNSPECIFIED OSTEOPOROSIS TYPE, UNSPECIFIED PATHOLOGICAL FRACTURE PRESENCE: ICD-10-CM

## 2023-12-20 DIAGNOSIS — F41.9 ANXIETY: ICD-10-CM

## 2023-12-20 LAB
AMPHETAMINES UR QL SCN: NORMAL
APPEARANCE UR: CLEAR
BARBITURATES UR QL SCN: NORMAL
BILIRUB UR STRIP.AUTO-MCNC: NEGATIVE MG/DL
BZE UR QL SCN: NORMAL
CANNABINOIDS UR QL SCN: NORMAL
COLOR UR: YELLOW
CREAT UR-MCNC: 123.6 MG/DL (ref 20–320)
GLUCOSE UR STRIP.AUTO-MCNC: NEGATIVE MG/DL
KETONES UR STRIP.AUTO-MCNC: NEGATIVE MG/DL
LEUKOCYTE ESTERASE UR QL STRIP.AUTO: NEGATIVE
NITRITE UR QL STRIP.AUTO: NEGATIVE
PCP UR QL SCN: NORMAL
PH UR STRIP.AUTO: 6 [PH]
PROT UR STRIP.AUTO-MCNC: NEGATIVE MG/DL
RBC # UR STRIP.AUTO: NEGATIVE /UL
SP GR UR STRIP.AUTO: 1.02
UROBILINOGEN UR STRIP.AUTO-MCNC: <2 MG/DL

## 2023-12-20 PROCEDURE — 80365 DRUG SCREENING OXYCODONE: CPT

## 2023-12-20 PROCEDURE — 80361 OPIATES 1 OR MORE: CPT

## 2023-12-20 PROCEDURE — 80307 DRUG TEST PRSMV CHEM ANLYZR: CPT

## 2023-12-20 PROCEDURE — 87086 URINE CULTURE/COLONY COUNT: CPT

## 2023-12-20 PROCEDURE — 80346 BENZODIAZEPINES1-12: CPT

## 2023-12-20 PROCEDURE — 80368 SEDATIVE HYPNOTICS: CPT

## 2023-12-20 PROCEDURE — 80358 DRUG SCREENING METHADONE: CPT

## 2023-12-20 PROCEDURE — 81003 URINALYSIS AUTO W/O SCOPE: CPT

## 2023-12-20 PROCEDURE — 80373 DRUG SCREENING TRAMADOL: CPT

## 2023-12-20 PROCEDURE — 82570 ASSAY OF URINE CREATININE: CPT

## 2023-12-20 PROCEDURE — 80354 DRUG SCREENING FENTANYL: CPT

## 2023-12-20 PROCEDURE — 99490 CHRNC CARE MGMT STAFF 1ST 20: CPT | Performed by: INTERNAL MEDICINE

## 2023-12-21 LAB — BACTERIA UR CULT: NORMAL

## 2023-12-26 LAB
1OH-MIDAZOLAM UR CFM-MCNC: <25 NG/ML
6MAM UR CFM-MCNC: <25 NG/ML
7AMINOCLONAZEPAM UR CFM-MCNC: 607 NG/ML
A-OH ALPRAZ UR CFM-MCNC: <25 NG/ML
ALPRAZ UR CFM-MCNC: <25 NG/ML
CHLORDIAZEP UR CFM-MCNC: <25 NG/ML
CLONAZEPAM UR CFM-MCNC: 35 NG/ML
CODEINE UR CFM-MCNC: <50 NG/ML
DIAZEPAM UR CFM-MCNC: <25 NG/ML
EDDP UR CFM-MCNC: <25 NG/ML
FENTANYL UR CFM-MCNC: <2.5 NG/ML
HYDROCODONE CTO UR CFM-MCNC: <25 NG/ML
HYDROMORPHONE UR CFM-MCNC: <25 NG/ML
LORAZEPAM UR CFM-MCNC: <25 NG/ML
METHADONE UR CFM-MCNC: <25 NG/ML
MIDAZOLAM UR CFM-MCNC: <25 NG/ML
MORPHINE UR CFM-MCNC: <50 NG/ML
NORDIAZEPAM UR CFM-MCNC: <25 NG/ML
NORFENTANYL UR CFM-MCNC: <2.5 NG/ML
NORHYDROCODONE UR CFM-MCNC: <25 NG/ML
NOROXYCODONE UR CFM-MCNC: <25 NG/ML
NORTRAMADOL UR-MCNC: <50 NG/ML
OXAZEPAM UR CFM-MCNC: <25 NG/ML
OXYCODONE UR CFM-MCNC: <25 NG/ML
OXYMORPHONE UR CFM-MCNC: <25 NG/ML
TEMAZEPAM UR CFM-MCNC: <25 NG/ML
TRAMADOL UR CFM-MCNC: <50 NG/ML
ZOLPIDEM UR CFM-MCNC: <25 NG/ML
ZOLPIDEM UR-MCNC: <25 NG/ML

## 2024-01-12 ENCOUNTER — DOCUMENTATION (OUTPATIENT)
Dept: PRIMARY CARE | Facility: CLINIC | Age: 79
End: 2024-01-12
Payer: MEDICARE

## 2024-01-12 DIAGNOSIS — F33.9 RECURRENT MAJOR DEPRESSIVE DISORDER, REMISSION STATUS UNSPECIFIED (CMS-HCC): ICD-10-CM

## 2024-01-12 DIAGNOSIS — M81.0 OSTEOPOROSIS, UNSPECIFIED OSTEOPOROSIS TYPE, UNSPECIFIED PATHOLOGICAL FRACTURE PRESENCE: ICD-10-CM

## 2024-01-12 DIAGNOSIS — Z59.41 FOOD INSECURITY: ICD-10-CM

## 2024-01-12 PROCEDURE — 99490 CHRNC CARE MGMT STAFF 1ST 20: CPT | Performed by: INTERNAL MEDICINE

## 2024-01-12 SDOH — ECONOMIC STABILITY - FOOD INSECURITY: FOOD INSECURITY: Z59.41

## 2024-01-12 NOTE — PROGRESS NOTES
CCM outreach with pt identified by name and .    LOV: 23  NOV: 24    Health Maintenance Due: MAWV    Goals for Depression:  Monitor mood and behavior changes.  Administer prescribed antidepressant medication.  Facilitate regular psychotherapy sessions.  Provide education on depression management.  Stick to a daily schedule  Call support group or person   Keep a healthy weight and eat healthy diet options    For Cholesterol:   Treatment goals include:  HIGHER IN FRUITS AND VEGGIES AND WHOLE GRAIN AND LOWER IN FATTY FOODS.     Reviewed warning s/s and advised to seek immediate medical attention if he develops discussed warning sx.    Spoke with pt who states she has had the flu and a cold in the last month, pt did test for COVID x2 both were negative.  Pt states her daughter in law has helped her set up a budget and got her a crock pot.  Pt still struggles with the budget but has been using the crock pot to make meals for more than 1 day and has found it helpful for increasng her food intake.  Pt states her mood has been ok and she is not struggling with feeling down as frequently.    Medications reviewed no refills requested.  Pt verbalizes understanding and is in agreement with POC.  Encouraged pt to reach out with nay healthcare needs.

## 2024-01-15 DIAGNOSIS — F41.9 ANXIETY: ICD-10-CM

## 2024-01-15 RX ORDER — CLONAZEPAM 0.5 MG/1
0.5 TABLET ORAL 3 TIMES DAILY
Qty: 90 TABLET | Refills: 0 | Status: SHIPPED | OUTPATIENT
Start: 2024-01-15 | End: 2024-02-12 | Stop reason: SDUPTHER

## 2024-02-09 ENCOUNTER — PATIENT OUTREACH (OUTPATIENT)
Dept: PRIMARY CARE | Facility: CLINIC | Age: 79
End: 2024-02-09
Payer: MEDICARE

## 2024-02-09 DIAGNOSIS — M81.0 OSTEOPOROSIS, UNSPECIFIED OSTEOPOROSIS TYPE, UNSPECIFIED PATHOLOGICAL FRACTURE PRESENCE: ICD-10-CM

## 2024-02-09 DIAGNOSIS — F33.9 RECURRENT MAJOR DEPRESSIVE DISORDER, REMISSION STATUS UNSPECIFIED (CMS-HCC): ICD-10-CM

## 2024-02-09 PROCEDURE — 99490 CHRNC CARE MGMT STAFF 1ST 20: CPT | Performed by: INTERNAL MEDICINE

## 2024-02-09 NOTE — PROGRESS NOTES
CCM outreach with pt identified by name and .    LOV: 23  NOV: 24    Health Maintenance Due: MAWV, Bone Density Scan    Goals for Depression:  Monitor mood and behavior changes.  Administer prescribed antidepressant medication.  Facilitate regular psychotherapy sessions.  Provide education on depression management.  Stick to a daily schedule  Call support group or person   Keep a healthy weight and eat healthy diet options    Spoke albert pt who states she is ok.  Pt states her mood is ok today but that her husbands birthday is next week and she might get blue and miss him.  Encouraged pt to reach out on my direct number if her depression or sadness increases so we can talk about it and if needed can reach out for support.  Pt asked where to go to meet people and activities, encouraged pt to get in touch with the [facility] for cards or other activities.  Pt thought that was a good idea then encouraged pt once a friendship is established it would be easier to meet for coffee afterwards and let the friendship grow.  Pt has the number and knows where it is located and states she will reach out.  Pt states she has been eating better and hopes at her next visit her weight will be stable    Medications reviewed no refills requested.  Pt verbalizes understanding and is in agreement with the POC.  Encouraged pt to reach out with any healthcare needs or if depression worsens.

## 2024-02-12 DIAGNOSIS — F41.9 ANXIETY: ICD-10-CM

## 2024-02-12 RX ORDER — CLONAZEPAM 0.5 MG/1
0.5 TABLET ORAL 3 TIMES DAILY
Qty: 90 TABLET | Refills: 0 | Status: SHIPPED | OUTPATIENT
Start: 2024-02-12 | End: 2024-03-11 | Stop reason: SDUPTHER

## 2024-02-27 DIAGNOSIS — F41.9 ANXIETY: ICD-10-CM

## 2024-02-27 NOTE — TELEPHONE ENCOUNTER
Pt called in stating she is having car troubles and will not be able to make it to her appointment tomorrow. She wanted to know if you would be willing to do a virtual with her so that she does not have to reschedule. Please advise

## 2024-02-28 ENCOUNTER — APPOINTMENT (OUTPATIENT)
Dept: PRIMARY CARE | Facility: CLINIC | Age: 79
End: 2024-02-28
Payer: MEDICARE

## 2024-02-28 ENCOUNTER — TELEMEDICINE (OUTPATIENT)
Dept: PRIMARY CARE | Facility: CLINIC | Age: 79
End: 2024-02-28
Payer: MEDICARE

## 2024-02-28 DIAGNOSIS — F33.9 RECURRENT MAJOR DEPRESSIVE DISORDER, REMISSION STATUS UNSPECIFIED (CMS-HCC): ICD-10-CM

## 2024-02-28 DIAGNOSIS — E46 PROTEIN-CALORIE MALNUTRITION, UNSPECIFIED SEVERITY (MULTI): ICD-10-CM

## 2024-02-28 DIAGNOSIS — F41.9 ANXIETY: ICD-10-CM

## 2024-02-28 DIAGNOSIS — E78.00 PURE HYPERCHOLESTEROLEMIA: ICD-10-CM

## 2024-02-28 DIAGNOSIS — K51.919 ULCERATIVE COLITIS WITH COMPLICATION, UNSPECIFIED LOCATION (MULTI): Primary | ICD-10-CM

## 2024-02-28 DIAGNOSIS — F10.21 HISTORY OF ALCOHOLISM (MULTI): ICD-10-CM

## 2024-02-28 DIAGNOSIS — F33.1 MODERATE EPISODE OF RECURRENT MAJOR DEPRESSIVE DISORDER (MULTI): ICD-10-CM

## 2024-02-28 DIAGNOSIS — G43.109 MIGRAINE WITH AURA AND WITHOUT STATUS MIGRAINOSUS, NOT INTRACTABLE: ICD-10-CM

## 2024-02-28 PROCEDURE — 1036F TOBACCO NON-USER: CPT | Performed by: INTERNAL MEDICINE

## 2024-02-28 PROCEDURE — 99442 PR PHYS/QHP TELEPHONE EVALUATION 11-20 MIN: CPT | Performed by: INTERNAL MEDICINE

## 2024-02-28 PROCEDURE — 1159F MED LIST DOCD IN RCRD: CPT | Performed by: INTERNAL MEDICINE

## 2024-02-28 RX ORDER — DICYCLOMINE HYDROCHLORIDE 10 MG/1
10 CAPSULE ORAL 4 TIMES DAILY
Qty: 120 CAPSULE | Refills: 2 | Status: SHIPPED | OUTPATIENT
Start: 2024-02-28 | End: 2024-05-08 | Stop reason: SDUPTHER

## 2024-02-28 RX ORDER — SUMATRIPTAN SUCCINATE 100 MG/1
100 TABLET ORAL ONCE AS NEEDED
Qty: 27 TABLET | Refills: 3 | Status: SHIPPED | OUTPATIENT
Start: 2024-02-28 | End: 2025-02-27
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
## 2024-02-28 NOTE — ASSESSMENT & PLAN NOTE
Some but not full benefit on sumatriptan without significant side effects, will uptitrate dose to 100mg.
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
## 2024-02-29 NOTE — ASSESSMENT & PLAN NOTE
With some improvement in symptoms as of late with additional support network including her children, care manager and now with likely instituting cognitive behavioral therapy. Encouragement provided, advised engagement as soon as possible.

## 2024-02-29 NOTE — ASSESSMENT & PLAN NOTE
They have signed a contract , 7/27.   Has been on this regimen longstanding without notable side effects and multiple intolerances to multiple drug regimens.   PDMP will be run with refills every 90 days.   Patient will be checked with random urine for drug screen and understands that this is mandatory and any issues with other medications that are not prescribed or illegal will mean that we will no longer provide the medications   they will need to be seen every 3 months to monitor and assess the need of the medication   I have considered the risks of abuse, dependence, addiction and diversion. I believe that it is clinically appropriate for this patient to be prescribed this medication.  MAYURI BLACKMAN

## 2024-03-11 ENCOUNTER — PATIENT OUTREACH (OUTPATIENT)
Dept: PRIMARY CARE | Facility: CLINIC | Age: 79
End: 2024-03-11
Payer: MEDICARE

## 2024-03-11 DIAGNOSIS — F41.9 ANXIETY: ICD-10-CM

## 2024-03-11 DIAGNOSIS — E78.00 PURE HYPERCHOLESTEROLEMIA: ICD-10-CM

## 2024-03-11 DIAGNOSIS — F33.1 MODERATE EPISODE OF RECURRENT MAJOR DEPRESSIVE DISORDER (MULTI): ICD-10-CM

## 2024-03-11 PROCEDURE — 99490 CHRNC CARE MGMT STAFF 1ST 20: CPT | Performed by: INTERNAL MEDICINE

## 2024-03-11 RX ORDER — CLONAZEPAM 0.5 MG/1
0.5 TABLET ORAL 3 TIMES DAILY
Qty: 90 TABLET | Refills: 0 | Status: SHIPPED | OUTPATIENT
Start: 2024-03-11 | End: 2024-04-11 | Stop reason: SDUPTHER

## 2024-03-11 NOTE — PROGRESS NOTES
CCM outreach with pt identified by name and .    LOV: 24  NOV: 24    Health Maintenance Due: MAWV, Bone Density Scan    Goals for Depression:  Monitor mood and behavior changes.  Administer prescribed antidepressant medication.  Facilitate regular psychotherapy sessions.  Provide education on depression management.  Stick to a daily schedule  Call support group or person   Keep a healthy weight and eat healthy diet options    For Cholesterol:   Treatment goals include:  HIGHER IN FRUITS AND VEGGIES AND WHOLE GRAIN AND LOWER IN FATTY FOODS.     Reviewed warning s/s and advised to seek immediate medical attention if he develops discussed warning sx.    Spoke with pt who is in good spirits.  Pt states she has been in a much better place mentally and realizes that now she is alone she has to get out and do things on her own and has been making efforts to get out everyday.  Pt states she has been eating better and cooking for herself healthy meals.  Pt states her children continue to be supportive of her.    Medications reviewed no refills requested.  Pt verbalizes understanding and is in agreement with POC.  Encouraged pt to reach out with any healthcare needs.

## 2024-03-18 ENCOUNTER — APPOINTMENT (OUTPATIENT)
Dept: RADIOLOGY | Facility: HOSPITAL | Age: 79
End: 2024-03-18
Payer: MEDICARE

## 2024-03-18 ENCOUNTER — HOSPITAL ENCOUNTER (EMERGENCY)
Facility: HOSPITAL | Age: 79
Discharge: HOME | End: 2024-03-18
Attending: EMERGENCY MEDICINE
Payer: MEDICARE

## 2024-03-18 VITALS
BODY MASS INDEX: 20.91 KG/M2 | OXYGEN SATURATION: 96 % | HEART RATE: 60 BPM | TEMPERATURE: 97.7 F | DIASTOLIC BLOOD PRESSURE: 70 MMHG | WEIGHT: 118 LBS | SYSTOLIC BLOOD PRESSURE: 144 MMHG | RESPIRATION RATE: 17 BRPM | HEIGHT: 63 IN

## 2024-03-18 DIAGNOSIS — K57.92 DIVERTICULITIS: Primary | ICD-10-CM

## 2024-03-18 LAB
ALBUMIN SERPL BCP-MCNC: 4.4 G/DL (ref 3.4–5)
ALP SERPL-CCNC: 88 U/L (ref 33–136)
ALT SERPL W P-5'-P-CCNC: 5 U/L (ref 7–45)
ANION GAP SERPL CALC-SCNC: 17 MMOL/L (ref 10–20)
APPEARANCE UR: CLEAR
AST SERPL W P-5'-P-CCNC: 17 U/L (ref 9–39)
BASOPHILS # BLD AUTO: 0.05 X10*3/UL (ref 0–0.1)
BASOPHILS NFR BLD AUTO: 0.4 %
BILIRUB SERPL-MCNC: 0.6 MG/DL (ref 0–1.2)
BILIRUB UR STRIP.AUTO-MCNC: NEGATIVE MG/DL
BUN SERPL-MCNC: 13 MG/DL (ref 6–23)
CALCIUM SERPL-MCNC: 9.5 MG/DL (ref 8.6–10.6)
CHLORIDE SERPL-SCNC: 103 MMOL/L (ref 98–107)
CO2 SERPL-SCNC: 25 MMOL/L (ref 21–32)
COLOR UR: YELLOW
CREAT SERPL-MCNC: 0.76 MG/DL (ref 0.5–1.05)
EGFRCR SERPLBLD CKD-EPI 2021: 81 ML/MIN/1.73M*2
EOSINOPHIL # BLD AUTO: 0.06 X10*3/UL (ref 0–0.4)
EOSINOPHIL NFR BLD AUTO: 0.5 %
ERYTHROCYTE [DISTWIDTH] IN BLOOD BY AUTOMATED COUNT: 13.2 % (ref 11.5–14.5)
GLUCOSE SERPL-MCNC: 99 MG/DL (ref 74–99)
GLUCOSE UR STRIP.AUTO-MCNC: NORMAL MG/DL
HCT VFR BLD AUTO: 37.3 % (ref 36–46)
HGB BLD-MCNC: 12.7 G/DL (ref 12–16)
HOLD SPECIMEN: NORMAL
IMM GRANULOCYTES # BLD AUTO: 0.04 X10*3/UL (ref 0–0.5)
IMM GRANULOCYTES NFR BLD AUTO: 0.3 % (ref 0–0.9)
KETONES UR STRIP.AUTO-MCNC: NEGATIVE MG/DL
LEUKOCYTE ESTERASE UR QL STRIP.AUTO: NEGATIVE
LYMPHOCYTES # BLD AUTO: 1.81 X10*3/UL (ref 0.8–3)
LYMPHOCYTES NFR BLD AUTO: 15.4 %
MAGNESIUM SERPL-MCNC: 2.21 MG/DL (ref 1.6–2.4)
MCH RBC QN AUTO: 29.4 PG (ref 26–34)
MCHC RBC AUTO-ENTMCNC: 34 G/DL (ref 32–36)
MCV RBC AUTO: 86 FL (ref 80–100)
MONOCYTES # BLD AUTO: 0.66 X10*3/UL (ref 0.05–0.8)
MONOCYTES NFR BLD AUTO: 5.6 %
NEUTROPHILS # BLD AUTO: 9.16 X10*3/UL (ref 1.6–5.5)
NEUTROPHILS NFR BLD AUTO: 77.8 %
NITRITE UR QL STRIP.AUTO: NEGATIVE
NRBC BLD-RTO: 0 /100 WBCS (ref 0–0)
PH UR STRIP.AUTO: 5 [PH]
PLATELET # BLD AUTO: 270 X10*3/UL (ref 150–450)
POTASSIUM SERPL-SCNC: 4.9 MMOL/L (ref 3.5–5.3)
PROT SERPL-MCNC: 7.1 G/DL (ref 6.4–8.2)
PROT UR STRIP.AUTO-MCNC: NEGATIVE MG/DL
RBC # BLD AUTO: 4.32 X10*6/UL (ref 4–5.2)
RBC # UR STRIP.AUTO: NEGATIVE /UL
SODIUM SERPL-SCNC: 140 MMOL/L (ref 136–145)
SP GR UR STRIP.AUTO: 1.02
UROBILINOGEN UR STRIP.AUTO-MCNC: NORMAL MG/DL
WBC # BLD AUTO: 11.8 X10*3/UL (ref 4.4–11.3)

## 2024-03-18 PROCEDURE — 96374 THER/PROPH/DIAG INJ IV PUSH: CPT

## 2024-03-18 PROCEDURE — 2500000004 HC RX 250 GENERAL PHARMACY W/ HCPCS (ALT 636 FOR OP/ED)

## 2024-03-18 PROCEDURE — 96376 TX/PRO/DX INJ SAME DRUG ADON: CPT

## 2024-03-18 PROCEDURE — 80053 COMPREHEN METABOLIC PANEL: CPT

## 2024-03-18 PROCEDURE — 96375 TX/PRO/DX INJ NEW DRUG ADDON: CPT

## 2024-03-18 PROCEDURE — 74176 CT ABD & PELVIS W/O CONTRAST: CPT | Performed by: RADIOLOGY

## 2024-03-18 PROCEDURE — 36415 COLL VENOUS BLD VENIPUNCTURE: CPT

## 2024-03-18 PROCEDURE — 99284 EMERGENCY DEPT VISIT MOD MDM: CPT | Mod: 25

## 2024-03-18 PROCEDURE — 96361 HYDRATE IV INFUSION ADD-ON: CPT

## 2024-03-18 PROCEDURE — 74176 CT ABD & PELVIS W/O CONTRAST: CPT

## 2024-03-18 PROCEDURE — 81003 URINALYSIS AUTO W/O SCOPE: CPT

## 2024-03-18 PROCEDURE — 85025 COMPLETE CBC W/AUTO DIFF WBC: CPT

## 2024-03-18 PROCEDURE — 2500000001 HC RX 250 WO HCPCS SELF ADMINISTERED DRUGS (ALT 637 FOR MEDICARE OP)

## 2024-03-18 PROCEDURE — 83735 ASSAY OF MAGNESIUM: CPT

## 2024-03-18 PROCEDURE — 99285 EMERGENCY DEPT VISIT HI MDM: CPT | Performed by: EMERGENCY MEDICINE

## 2024-03-18 RX ORDER — DIPHENHYDRAMINE HYDROCHLORIDE 50 MG/ML
50 INJECTION INTRAMUSCULAR; INTRAVENOUS ONCE
Status: DISCONTINUED | OUTPATIENT
Start: 2024-03-18 | End: 2024-03-18 | Stop reason: HOSPADM

## 2024-03-18 RX ORDER — ONDANSETRON HYDROCHLORIDE 2 MG/ML
4 INJECTION, SOLUTION INTRAVENOUS ONCE
Status: COMPLETED | OUTPATIENT
Start: 2024-03-18 | End: 2024-03-18

## 2024-03-18 RX ORDER — ONDANSETRON 4 MG/1
4 TABLET, FILM COATED ORAL EVERY 6 HOURS
Qty: 12 TABLET | Refills: 0 | Status: SHIPPED | OUTPATIENT
Start: 2024-03-18 | End: 2024-03-21

## 2024-03-18 RX ORDER — FENTANYL CITRATE 50 UG/ML
50 INJECTION, SOLUTION INTRAMUSCULAR; INTRAVENOUS ONCE
Status: COMPLETED | OUTPATIENT
Start: 2024-03-18 | End: 2024-03-18

## 2024-03-18 RX ORDER — METRONIDAZOLE 500 MG/1
500 TABLET ORAL 3 TIMES DAILY
Qty: 30 TABLET | Refills: 0 | Status: SHIPPED | OUTPATIENT
Start: 2024-03-18 | End: 2024-03-25

## 2024-03-18 RX ORDER — SULFAMETHOXAZOLE AND TRIMETHOPRIM 800; 160 MG/1; MG/1
1 TABLET ORAL 2 TIMES DAILY
Qty: 14 TABLET | Refills: 0 | Status: SHIPPED | OUTPATIENT
Start: 2024-03-18 | End: 2024-03-25

## 2024-03-18 RX ORDER — TRAMADOL HYDROCHLORIDE 50 MG/1
50 TABLET ORAL ONCE
Status: COMPLETED | OUTPATIENT
Start: 2024-03-18 | End: 2024-03-18

## 2024-03-18 RX ADMIN — FENTANYL CITRATE 50 MCG: 50 INJECTION INTRAMUSCULAR; INTRAVENOUS at 04:01

## 2024-03-18 RX ADMIN — ONDANSETRON 4 MG: 2 INJECTION INTRAMUSCULAR; INTRAVENOUS at 04:01

## 2024-03-18 RX ADMIN — ONDANSETRON 4 MG: 2 INJECTION INTRAMUSCULAR; INTRAVENOUS at 03:03

## 2024-03-18 RX ADMIN — SODIUM CHLORIDE, POTASSIUM CHLORIDE, SODIUM LACTATE AND CALCIUM CHLORIDE 500 ML: 600; 310; 30; 20 INJECTION, SOLUTION INTRAVENOUS at 03:40

## 2024-03-18 RX ADMIN — TRAMADOL HYDROCHLORIDE 50 MG: 50 TABLET, COATED ORAL at 02:03

## 2024-03-18 ASSESSMENT — PAIN - FUNCTIONAL ASSESSMENT
PAIN_FUNCTIONAL_ASSESSMENT: 0-10
PAIN_FUNCTIONAL_ASSESSMENT: 0-10

## 2024-03-18 ASSESSMENT — PAIN SCALES - GENERAL
PAINLEVEL_OUTOF10: 8
PAINLEVEL_OUTOF10: 7

## 2024-03-18 ASSESSMENT — PAIN DESCRIPTION - ORIENTATION
ORIENTATION: LEFT;LOWER
ORIENTATION: LEFT;LOWER

## 2024-03-18 ASSESSMENT — COLUMBIA-SUICIDE SEVERITY RATING SCALE - C-SSRS
1. IN THE PAST MONTH, HAVE YOU WISHED YOU WERE DEAD OR WISHED YOU COULD GO TO SLEEP AND NOT WAKE UP?: NO
2. HAVE YOU ACTUALLY HAD ANY THOUGHTS OF KILLING YOURSELF?: NO
6. HAVE YOU EVER DONE ANYTHING, STARTED TO DO ANYTHING, OR PREPARED TO DO ANYTHING TO END YOUR LIFE?: NO

## 2024-03-18 ASSESSMENT — PAIN DESCRIPTION - LOCATION
LOCATION: ABDOMEN
LOCATION: ABDOMEN

## 2024-03-18 NOTE — ED PROVIDER NOTES
HPI   Chief Complaint   Patient presents with    Abdominal Pain       HPI  Patient is a 78-year-old female with past medical history of ulcerative colitis and diverticulitis presenting to the emergency department with 10/10 lower abdominal pain.  She states she has been having this pain for a few weeks however over the last week it has become severe.  It is localized to the left lower quadrant and lower abdomen.  Patient also reports associated nausea and abdominal cramping.  She vomited multiple times yesterday and states she has not been eating or drinking well.  She reports both constipation and diarrhea as well.  Denies hematochezia or melena.  Patient also reports dysuria for the past couple of days, denies hematuria.  Patient denies any fevers/chills.  Denies chest pain or shortness of breath.               Oscar Coma Scale Score: 15                     Patient History   No past medical history on file.  Past Surgical History:   Procedure Laterality Date    BI MR BREAST BILATERAL WITHOUT CONTRAST FOR IMPLANT INTEGRITY      CATARACT EXTRACTION      HYSTERECTOMY      TONSILLECTOMY       No family history on file.  Social History     Tobacco Use    Smoking status: Never    Smokeless tobacco: Never   Substance Use Topics    Alcohol use: Never    Drug use: Never       Physical Exam   ED Triage Vitals [03/18/24 0020]   Temperature Heart Rate Respirations BP   36.5 °C (97.7 °F) 60 17 144/70      Pulse Ox Temp Source Heart Rate Source Patient Position   96 % Oral Monitor Sitting      BP Location FiO2 (%)     Right arm 21 %       Physical Exam  General: Well developed patient, alert and oriented, anxious  HEENT: no lesions, no scleral icterus, moist mucous membranes  Neuro: A&Ox3, grossly normal  CV: RRR, nrl S1, S2, no murmur, rubs, or clicks  Resp: Good bilateral air entry. No crackles,  wheezing, or rhonchi  Abdomen: Abdomen soft, diffusely tender to palpation especially in the left lower quadrant  Extremities: No  lower extremity edema, + PP  Skin: No jaundice, no lesions   Psych: Anxious    ED Course & MDM   ED Course as of 03/21/24 0722   Mon Mar 18, 2024   0038 EKG Interpretation by Tam Sigala MD  Indication: Abdominal pain  Rate: 59 bpm  Rhythm: Sinus nikolay  Axis: Normal  Intervals: Normal  ST Intervals: TWI in lead II, III, and aVF  Comparison: No previous on record [MS]      ED Course User Index  [MS] Tam Sigala MD         Diagnoses as of 03/21/24 0722   Diverticulitis       Medical Decision Making  78-year-old female with history of ulcerative colitis and diverticulitis presenting with 1 week of worsening left lower quadrant abdominal pain with associated nausea and vomiting.  Denies hematemesis, melena or hematochezia.  Patient also reports dysuria.  On arrival patient is hemodynamically and vitally stable.  On exam she is very anxious and is tender to palpation in the left lower quadrant.  Heart regular rate and rhythm, lungs clear.  Basic labs and urinalysis ordered.  CBC notable for mild leukocytosis with white count of 11.8.  CMP unremarkable.  UA unremarkable.  Patient was given a dose of IV Zofran and tramadol per patient request.  Patient has an allergy to iodine and is adamantly refusing pretreatment with steroids in order to obtain CT abdomen pelvis with contrast.  The difference between noncontrast and contrast CT was explained to patient in great detail including limitations in the evaluation without using contrast.  Patient still is refusing a scan with contrast as she states she knows her body and will not tolerate pretreatment with steroids.  Patient is however agreeable to undergoing noncontrast CT abdomen pelvis.  Patient was given another dose of IV Zofran and was agreeable to receiving 50 mcg IV fentanyl for further pain control.  Patient also given 500 cc bolus LR.  Upon reevaluation patient reported significant improvement in her pain and was sleeping comfortably.  CT scan showed  sigmoid diverticulosis with mild fat stranding suggestive of mild diverticulitis.  Given patient's nonimpressive white count and low concern for significant diverticulitis, will plan to discharge patient home with oral antibiotics.  Patient was discharged home in stable condition with 7-day course of Bactrim and Flagyl.  Patient did have a sulfa allergy listed in her chart however she states that this is inaccurate and she does not have anaphylactic reaction to Bactrim, only has mild itching/rash and is okay with taking Bactrim.    Procedure  Procedures     Young Rutledge DO  Resident  03/21/24 1816

## 2024-03-18 NOTE — DISCHARGE INSTRUCTIONS
Take a 7-day course of Bactrim and Flagyl for treatment of diverticulitis    Take Zofran as needed for nausea    Referrals been sent for follow-up with a gastroenterologist for further management

## 2024-03-18 NOTE — ED TRIAGE NOTES
PT arrives from home via [facility] with c/o x1 week of abdominal pain, n/v/d.  PT has history of colitis and diverticulitis.  PT states pain is in her LLQ.

## 2024-04-09 ENCOUNTER — PATIENT OUTREACH (OUTPATIENT)
Dept: PRIMARY CARE | Facility: CLINIC | Age: 79
End: 2024-04-09
Payer: MEDICARE

## 2024-04-09 DIAGNOSIS — E78.00 PURE HYPERCHOLESTEROLEMIA: ICD-10-CM

## 2024-04-09 DIAGNOSIS — F33.1 MODERATE EPISODE OF RECURRENT MAJOR DEPRESSIVE DISORDER (MULTI): ICD-10-CM

## 2024-04-09 PROCEDURE — 99490 CHRNC CARE MGMT STAFF 1ST 20: CPT | Performed by: INTERNAL MEDICINE

## 2024-04-09 NOTE — PROGRESS NOTES
CCM outreach with pt identified by name and .    LOV: 23  NOV: 24    Health Maintenance Due: MAWV    Goals for Depression:  Monitor mood and behavior changes.  Administer prescribed antidepressant medication.  Facilitate regular psychotherapy sessions.  Provide education on depression management.  Stick to a daily schedule  Call support group or person   Keep a healthy weight and eat healthy diet options    For Cholesterol:   Treatment goals include:  HIGHER IN FRUITS AND VEGGIES AND WHOLE GRAIN AND LOWER IN FATTY FOODS.     Reviewed warning s/s and advised to seek immediate medical attention if he develops discussed warning sx.    Spoke with pt who states she had a colitis attack and has an appointment with a GI MD to address the issue.  Pt has hd colitis in the past but needs a specialist to address he meds and issues.  Pt has been in good spirits and states she has some friends coming to visit tomorrow from [place].  Pt states she is eating better and doing more cooking for herself.      Medications reviewed no refills requested.  Pt verbalizes understanding and is in agreement with POC.  Encouraged pt to reach out with any healthcare need.s    No medication changes made prior to patient leaving the country for vacation.  Jardiance dose recently increased, will check CMP today.  Plan to follow-up in about 3 months, with A1c check 1 week prior to next visit.

## 2024-05-08 ENCOUNTER — OFFICE VISIT (OUTPATIENT)
Dept: GASTROENTEROLOGY | Facility: CLINIC | Age: 79
End: 2024-05-08
Payer: MEDICARE

## 2024-05-08 VITALS — HEART RATE: 73 BPM | HEIGHT: 63 IN | WEIGHT: 120 LBS | OXYGEN SATURATION: 96 % | BODY MASS INDEX: 21.26 KG/M2

## 2024-05-08 DIAGNOSIS — K58.9 IRRITABLE BOWEL SYNDROME, UNSPECIFIED TYPE: ICD-10-CM

## 2024-05-08 DIAGNOSIS — Z86.010 HISTORY OF COLON POLYPS: ICD-10-CM

## 2024-05-08 DIAGNOSIS — K57.92 DIVERTICULITIS: ICD-10-CM

## 2024-05-08 DIAGNOSIS — K51.919 ULCERATIVE COLITIS WITH COMPLICATION, UNSPECIFIED LOCATION (MULTI): ICD-10-CM

## 2024-05-08 DIAGNOSIS — R10.12 LUQ PAIN: ICD-10-CM

## 2024-05-08 DIAGNOSIS — R10.32 LLQ PAIN: Primary | ICD-10-CM

## 2024-05-08 PROBLEM — Z86.0100 HISTORY OF COLON POLYPS: Status: ACTIVE | Noted: 2024-05-08

## 2024-05-08 PROCEDURE — 1159F MED LIST DOCD IN RCRD: CPT

## 2024-05-08 PROCEDURE — 1036F TOBACCO NON-USER: CPT

## 2024-05-08 PROCEDURE — 99203 OFFICE O/P NEW LOW 30 MIN: CPT

## 2024-05-08 RX ORDER — DICYCLOMINE HYDROCHLORIDE 10 MG/1
10 CAPSULE ORAL 4 TIMES DAILY
Qty: 120 CAPSULE | Refills: 2 | Status: SHIPPED | OUTPATIENT
Start: 2024-05-08 | End: 2024-06-05

## 2024-05-08 RX ORDER — METRONIDAZOLE 500 MG/1
500 TABLET ORAL 2 TIMES DAILY
Qty: 28 TABLET | Refills: 0 | Status: SHIPPED | OUTPATIENT
Start: 2024-05-08 | End: 2024-05-22

## 2024-05-08 RX ORDER — CIPROFLOXACIN 500 MG/1
500 TABLET ORAL 2 TIMES DAILY
Qty: 28 TABLET | Refills: 0 | Status: SHIPPED | OUTPATIENT
Start: 2024-05-08 | End: 2024-05-22

## 2024-05-08 ASSESSMENT — ENCOUNTER SYMPTOMS
TROUBLE SWALLOWING: 0
CONSTIPATION: 0
NAUSEA: 0
SHORTNESS OF BREATH: 0
BLOOD IN STOOL: 0
FATIGUE: 0
ABDOMINAL DISTENTION: 0
COUGH: 0
ABDOMINAL PAIN: 1
DIARRHEA: 0
APPETITE CHANGE: 0
CHILLS: 0
FEVER: 0
ANAL BLEEDING: 0
RECTAL PAIN: 0
VOMITING: 0

## 2024-05-08 NOTE — PROGRESS NOTES
Subjective     History of Present Illness:   Elida Yoon is a 78 y.o. female with PMHx of chronic constipation/IBS, esophageal reflux, MV disorder, UC, alcoholism who presents to GI clinic for further evaluation diverticulitis and to establish care  3/2024 CT without contrast for LLQ pain suggested mild sigmoid diverticulitis.  Patient was treated with antibiotics.    Today, patient states since she was a teenager and always has had colon issues.  Last month was vomiting with diarrhea and went to the ED.  [place] she was going to pass out.  Was diagnosed with diverticulitis and given antibiotics she was allergic to, so she never took them.  Feels she has not been emptying her bowels completely.  Increased fluids, fiber which did not help.  Has has history of multiple colon polyps when she was young.  Had incontinence and fecal urgency as a child.  Changed her diet.  Has high stress level and is working on managing.  Just moved back to [place].  Bentyl helps her the most for abdomina cramping.  Diarrhea, nausea vomiting have resolved.  Moving bowels once daily, not straining with formed stools and incomplete emptying.  Has LLQ cramping, fullness and tenderness still since last month.  Denies fevers.  States she has history of UC but is not on any medications for it and did not show up on her last colonoscopy.  Denies diarrhea, dyspepsia, melena, hematochezia, dysphagia, unintentional weight loss    Denies ETOH, smoking 3-4 cigarettes daily, denies marijuana  Denies fxh GI cancer or IBD  Abdominal Surgeries: denies    [place]- [place]- diverticulosis, no polyps     1/2005 flex sig for diarrhea: Chronic colitis sigmoid and rectum.  Pathology positive cryptitis/possible UC      Past Medical History  As per HPI.     Social History  she  reports that she has never smoked. She has never used smokeless tobacco. She reports that she does not drink alcohol and does not use drugs.     Family  History  her family history is not on file.     Review of Systems  Review of Systems   Constitutional:  Negative for appetite change, chills, fatigue and fever.   HENT:  Negative for trouble swallowing.    Respiratory:  Negative for cough and shortness of breath.    Gastrointestinal:  Positive for abdominal pain (LLQ). Negative for abdominal distention, anal bleeding, blood in stool, constipation, diarrhea, nausea, rectal pain and vomiting.       Allergies  Allergies   Allergen Reactions    Aspirin Anaphylaxis    Bee Venom Protein (Honey Bee) Anaphylaxis    Demerol [Meperidine] Anaphylaxis    Iodine Anaphylaxis    Penicillins Anaphylaxis    Sulfa (Sulfonamide Antibiotics) Anaphylaxis    Codeine Unknown       Medications  Current Outpatient Medications   Medication Instructions    ciprofloxacin (CIPRO) 500 mg, oral, 2 times daily    clonazePAM (KLONOPIN) 0.5 mg, oral, 3 times daily    dicyclomine (BENTYL) 10 mg, oral, 4 times daily    fluticasone (Flonase) 50 mcg/actuation nasal spray 1 spray, Each Nostril, Daily, Shake gently. Before first use, prime pump. After use, clean tip and replace cap.    gabapentin (NEURONTIN) 100 mg, oral, Nightly    metroNIDAZOLE (FLAGYL) 500 mg, oral, 2 times daily    SUMAtriptan (IMITREX) 100 mg, oral, Once as needed, May repeat after 2 hours.        Objective   Visit Vitals  Pulse 73      Physical Exam  Constitutional:       Appearance: Normal appearance. She is normal weight.   HENT:      Mouth/Throat:      Mouth: Mucous membranes are dry.      Pharynx: Oropharynx is clear.   Cardiovascular:      Rate and Rhythm: Normal rate and regular rhythm.   Pulmonary:      Effort: Pulmonary effort is normal.      Breath sounds: Normal breath sounds. No wheezing or rhonchi.   Abdominal:      General: Abdomen is flat. Bowel sounds are normal. There is no distension.      Palpations: Abdomen is soft. There is no hepatomegaly.      Tenderness: There is abdominal tenderness (LLQ, LUQ). There is  guarding. There is no rebound. Negative signs include Gill's sign.      Hernia: No hernia is present.   Musculoskeletal:         General: Normal range of motion.   Skin:     General: Skin is warm and dry.   Neurological:      General: No focal deficit present.      Mental Status: She is alert and oriented to person, place, and time.   Psychiatric:         Mood and Affect: Mood normal.         Behavior: Behavior normal.           Lab Results   Component Value Date    WBC 11.8 (H) 03/18/2024    WBC 10.3 11/21/2023    HGB 12.7 03/18/2024    HGB 14.5 11/21/2023    HCT 37.3 03/18/2024    HCT 46.7 (H) 11/21/2023     03/18/2024     11/21/2023     Lab Results   Component Value Date     03/18/2024     11/21/2023    K 4.9 03/18/2024    K 4.9 11/21/2023     03/18/2024     11/21/2023    CO2 25 03/18/2024    CO2 29 11/21/2023    BUN 13 03/18/2024    BUN 17 11/21/2023    CREATININE 0.76 03/18/2024    CREATININE 0.84 11/21/2023    CALCIUM 9.5 03/18/2024    CALCIUM 10.4 11/21/2023    PROT 7.1 03/18/2024    PROT 7.4 11/21/2023    BILITOT 0.6 03/18/2024    BILITOT 0.9 11/21/2023    ALKPHOS 88 03/18/2024    ALKPHOS 84 11/21/2023    ALT 5 (L) 03/18/2024    ALT 13 11/21/2023    AST 17 03/18/2024    AST 20 11/21/2023    GLUCOSE 99 03/18/2024    GLUCOSE 97 11/21/2023           Elida Yoon is a 78 y.o. female who presents to GI clinic for diverticulitis.    Diverticulitis  -Cipro Flagyl x 14 days with bland diet, then advance diet as tolerated  -Once symptoms resolved start daily fiber supplement    LLQ pain  -As needed Bentyl    LUQ pain  Consider gastritis, esophagitis, duodenitis, PUD  -Follow-up in 1 month and reassess for GERD         FRANCESCA Foley-CNP

## 2024-05-08 NOTE — ASSESSMENT & PLAN NOTE
-Cipro Flagyl x 14 days with bland diet, then advance diet as tolerated  -Once symptoms resolved start daily fiber supplement

## 2024-05-08 NOTE — PATIENT INSTRUCTIONS
Please take cipro and flagyl twice daily with food x 14 days.  I recommend more of a bland diet for now low in fiber.    Please take 1-2 spoonfuls daily of fiber.  Dissolve in 8 oz liquid or you may use fiber gummies.  This is to help maintain stool consistency.  Start this once you finish antibiotics/symptoms of pain have resolved.    Continue as needed Bentyl for pain    Follow up in 1 month so I can reassess for overactive stomach acid

## 2024-05-09 ENCOUNTER — PATIENT OUTREACH (OUTPATIENT)
Dept: PRIMARY CARE | Facility: CLINIC | Age: 79
End: 2024-05-09
Payer: MEDICARE

## 2024-05-09 DIAGNOSIS — E78.00 PURE HYPERCHOLESTEROLEMIA: ICD-10-CM

## 2024-05-09 DIAGNOSIS — F33.1 MODERATE EPISODE OF RECURRENT MAJOR DEPRESSIVE DISORDER (MULTI): ICD-10-CM

## 2024-05-09 PROCEDURE — 99490 CHRNC CARE MGMT STAFF 1ST 20: CPT | Performed by: INTERNAL MEDICINE

## 2024-05-09 NOTE — PROGRESS NOTES
CCM outreach with pt identified by name and .      LOV: 24  NOV: 24    Health Maintenance Due: MAWV,  Bone Density Scan    Treatment Goals:  For high blood pressure:   Treatment goals include:  [place]/continue prudent diet and regular exercise.   Blood Pressure Treatment goals include:   BP of 120/80, with no higher than 140/85 on consistent basis.   Exercise at least 3-4 days a week for at least 30 minutes  Eat a balanced diet, rich in fruits and vegetables, low in sodium and processed foods.  If you are overweight, work toward a goal BMI of less than 25, with short-term goal of 10 pound weight loss.    Think about those things which may be affecting your ability to reach those goals, and develop a plan to overcome them.    Additional resources are available upon request to help you attain goals, including dietitian.  Goals for Depression:  Monitor mood and behavior changes.  Administer prescribed antidepressant medication.  Facilitate regular psychotherapy sessions.  Provide education on depression management.  Stick to a daily schedule  Call support group or person   Keep a healthy weight and eat healthy diet options    Spoke with pt who is in good spirits looking forward to Mothers Day and spending it with her daughter.  Pt saw a GI doctor and is going to be starting her antibiotics today.  Reviewed importance of taking medication as prescribed and finishing all of it. Also reviewed with pt the importance of eating prior to taking the medication so she does not experience nausea from them.     Medications reviewed refills routed to MA for refill.  Pt verbalizes understanding and is in agreement with POC.  Encouraged pt to reach out with any Healthcare needs

## 2024-05-14 DIAGNOSIS — F41.9 ANXIETY: ICD-10-CM

## 2024-05-14 RX ORDER — CLONAZEPAM 0.5 MG/1
0.5 TABLET ORAL 3 TIMES DAILY
Qty: 90 TABLET | Refills: 0 | Status: SHIPPED | OUTPATIENT
Start: 2024-05-14 | End: 2024-06-03 | Stop reason: SDUPTHER

## 2024-06-03 ENCOUNTER — OFFICE VISIT (OUTPATIENT)
Dept: PRIMARY CARE | Facility: CLINIC | Age: 79
End: 2024-06-03
Payer: MEDICARE

## 2024-06-03 VITALS — SYSTOLIC BLOOD PRESSURE: 148 MMHG | DIASTOLIC BLOOD PRESSURE: 66 MMHG | BODY MASS INDEX: 21.12 KG/M2 | WEIGHT: 119.2 LBS

## 2024-06-03 DIAGNOSIS — Z12.31 SCREENING MAMMOGRAM FOR BREAST CANCER: ICD-10-CM

## 2024-06-03 DIAGNOSIS — F33.9 DEPRESSION, RECURRENT (CMS-HCC): ICD-10-CM

## 2024-06-03 DIAGNOSIS — F17.200 TOBACCO USE DISORDER: ICD-10-CM

## 2024-06-03 DIAGNOSIS — K51.919 ULCERATIVE COLITIS WITH COMPLICATION, UNSPECIFIED LOCATION (MULTI): ICD-10-CM

## 2024-06-03 DIAGNOSIS — F43.10 PTSD (POST-TRAUMATIC STRESS DISORDER): ICD-10-CM

## 2024-06-03 DIAGNOSIS — F41.9 ANXIETY: ICD-10-CM

## 2024-06-03 DIAGNOSIS — R03.0 ELEVATED BLOOD PRESSURE READING: ICD-10-CM

## 2024-06-03 DIAGNOSIS — R06.2 WHEEZING: ICD-10-CM

## 2024-06-03 DIAGNOSIS — Z91.81 AT HIGH RISK FOR FALLS: Primary | ICD-10-CM

## 2024-06-03 DIAGNOSIS — G43.109 MIGRAINE WITH AURA AND WITHOUT STATUS MIGRAINOSUS, NOT INTRACTABLE: ICD-10-CM

## 2024-06-03 DIAGNOSIS — E78.00 PURE HYPERCHOLESTEROLEMIA: ICD-10-CM

## 2024-06-03 DIAGNOSIS — Z00.00 ROUTINE GENERAL MEDICAL EXAMINATION AT HEALTH CARE FACILITY: ICD-10-CM

## 2024-06-03 PROCEDURE — 1159F MED LIST DOCD IN RCRD: CPT | Performed by: INTERNAL MEDICINE

## 2024-06-03 PROCEDURE — 1158F ADVNC CARE PLAN TLK DOCD: CPT | Performed by: INTERNAL MEDICINE

## 2024-06-03 PROCEDURE — 99214 OFFICE O/P EST MOD 30 MIN: CPT | Performed by: INTERNAL MEDICINE

## 2024-06-03 PROCEDURE — 1036F TOBACCO NON-USER: CPT | Performed by: INTERNAL MEDICINE

## 2024-06-03 PROCEDURE — 1170F FXNL STATUS ASSESSED: CPT | Performed by: INTERNAL MEDICINE

## 2024-06-03 PROCEDURE — 1123F ACP DISCUSS/DSCN MKR DOCD: CPT | Performed by: INTERNAL MEDICINE

## 2024-06-03 PROCEDURE — 1160F RVW MEDS BY RX/DR IN RCRD: CPT | Performed by: INTERNAL MEDICINE

## 2024-06-03 PROCEDURE — G0438 PPPS, INITIAL VISIT: HCPCS | Performed by: INTERNAL MEDICINE

## 2024-06-03 RX ORDER — CLONAZEPAM 0.5 MG/1
0.5 TABLET ORAL 3 TIMES DAILY
Qty: 90 TABLET | Refills: 3 | Status: SHIPPED | OUTPATIENT
Start: 2024-06-03 | End: 2024-09-01

## 2024-06-03 RX ORDER — ALBUTEROL SULFATE 90 UG/1
2 AEROSOL, METERED RESPIRATORY (INHALATION) EVERY 4 HOURS PRN
Qty: 8 G | Refills: 1 | Status: SHIPPED | OUTPATIENT
Start: 2024-06-03 | End: 2025-06-03

## 2024-06-03 ASSESSMENT — PATIENT HEALTH QUESTIONNAIRE - PHQ9
1. LITTLE INTEREST OR PLEASURE IN DOING THINGS: NOT AT ALL
SUM OF ALL RESPONSES TO PHQ9 QUESTIONS 1 AND 2: 0
2. FEELING DOWN, DEPRESSED OR HOPELESS: NOT AT ALL

## 2024-06-03 ASSESSMENT — ACTIVITIES OF DAILY LIVING (ADL)
BATHING: INDEPENDENT
DOING_HOUSEWORK: INDEPENDENT
GROCERY_SHOPPING: INDEPENDENT
DRESSING: INDEPENDENT
MANAGING_FINANCES: INDEPENDENT
TAKING_MEDICATION: INDEPENDENT
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
## 2024-06-03 NOTE — ASSESSMENT & PLAN NOTE
Now daily migraines, no response to sumatriptan. Not on any preventive measures.    discussed consideration for imaging for further workup given change in headache pattern but she currently declines.  She states that she does not believe she has any intracranial abnormalities.   discussed risks of undiagnosed condition and she currently expresses understanding.  Will uptitrate regimen for now and if remains uncontrolled would consider pursuing further workup   No response to triptan, start CGRP antagonist.   multiple intolerances to medication in the past hesitant to initiate a medication for prevention.  Trial of magnesium and riboflavin.

## 2024-06-04 NOTE — ASSESSMENT & PLAN NOTE
Previously quiescent and untreated, now with recurrent abdominal discomfort has upcoming appointment scheduled with gastroenterology.  Advised consideration for endoscopic imaging.

## 2024-06-04 NOTE — ASSESSMENT & PLAN NOTE
Amenable to institution of cholesterol-lowering agent, reported multiple intolerances in the past.  Will attempt to research what medications have been prescribed in the past

## 2024-06-05 ENCOUNTER — OFFICE VISIT (OUTPATIENT)
Dept: GASTROENTEROLOGY | Facility: CLINIC | Age: 79
End: 2024-06-05
Payer: MEDICARE

## 2024-06-05 VITALS — HEART RATE: 72 BPM | OXYGEN SATURATION: 96 % | BODY MASS INDEX: 21.09 KG/M2 | WEIGHT: 119 LBS | HEIGHT: 63 IN

## 2024-06-05 DIAGNOSIS — K57.92 DIVERTICULITIS: Primary | ICD-10-CM

## 2024-06-05 DIAGNOSIS — K21.9 GASTROESOPHAGEAL REFLUX DISEASE, UNSPECIFIED WHETHER ESOPHAGITIS PRESENT: ICD-10-CM

## 2024-06-05 PROCEDURE — 1159F MED LIST DOCD IN RCRD: CPT

## 2024-06-05 PROCEDURE — 99213 OFFICE O/P EST LOW 20 MIN: CPT

## 2024-06-05 PROCEDURE — 1036F TOBACCO NON-USER: CPT

## 2024-06-05 RX ORDER — PANTOPRAZOLE SODIUM 40 MG/1
40 TABLET, DELAYED RELEASE ORAL DAILY
Qty: 30 TABLET | Refills: 11 | Status: SHIPPED | OUTPATIENT
Start: 2024-06-05 | End: 2025-06-05

## 2024-06-05 RX ORDER — LEVOFLOXACIN 750 MG/1
750 TABLET ORAL DAILY
Qty: 14 TABLET | Refills: 0 | Status: SHIPPED | OUTPATIENT
Start: 2024-06-05 | End: 2024-06-19

## 2024-06-05 RX ORDER — DICYCLOMINE HYDROCHLORIDE 10 MG/5ML
20 SOLUTION ORAL
Qty: 1200 ML | Refills: 11 | Status: SHIPPED | OUTPATIENT
Start: 2024-06-05 | End: 2025-06-05

## 2024-06-05 RX ORDER — METRONIDAZOLE 500 MG/1
500 TABLET ORAL 2 TIMES DAILY
Qty: 28 TABLET | Refills: 0 | Status: SHIPPED | OUTPATIENT
Start: 2024-06-05 | End: 2024-06-19

## 2024-06-05 ASSESSMENT — ENCOUNTER SYMPTOMS
NAUSEA: 0
COUGH: 0
APPETITE CHANGE: 0
FATIGUE: 0
SHORTNESS OF BREATH: 0
DIARRHEA: 1
VOMITING: 0
FEVER: 0
TROUBLE SWALLOWING: 0
RECTAL PAIN: 0
BLOOD IN STOOL: 0
CHILLS: 0
ABDOMINAL DISTENTION: 1
ABDOMINAL PAIN: 1
CONSTIPATION: 1
ANAL BLEEDING: 0

## 2024-06-05 NOTE — PROGRESS NOTES
Subjective     History of Present Illness:   Elida Yoon is a 78 y.o. female with PMHx of chronic constipation/IBS, esophageal reflux, MV disorder, UC, alcoholism who presents to GI clinic for follow-up  Last seen 5/2024 for diverticulitis.  Cipro and Flagyl prescribed, advance diet as tolerated and start daily fiber with resolution.  LUQ pain-reassess during next visit due to possible correlation with diverticulitis  3/2024 CT without contrast for LLQ pain suggested mild sigmoid diverticulitis.  Patient was treated with antibiotics.    Today, patient states she is still experiencing low abdominal cramping, distention, alternating constipation vs. diarrhea a few times daily.  States she only took flagyl and did not take Cipro since cipro was wrapped in iodine, which she is allergic to.  Has allergy to penicillin.  States she has severe acid reflux on a daily basis which is uncontrolled.  Denies melena, hematochezia, dysphagia, unintentional weight loss    Denies ETOH, smoking 3-4 cigarettes daily, denies marijuana  Denies fxh GI cancer or IBD  Abdominal Surgeries: denies    Last colon[place]- reports was in [place] 1 [place] ago- diverticulosis, no polyps    1/2005 flex sig for diarrhea: Chronic colitis sigmoid and rectum.  Pathology positive cryptitis/possible UC      Past Medical History  As per HPI.     Social History  she  reports that she has never smoked. She has never used smokeless tobacco. She reports that she does not drink alcohol and does not use drugs.     Family History  her family history is not on file.     Review of Systems  Review of Systems   Constitutional:  Negative for appetite change, chills, fatigue and fever.   HENT:  Negative for trouble swallowing.    Respiratory:  Negative for cough and shortness of breath.    Gastrointestinal:  Positive for abdominal distention, abdominal pain (LLQ), constipation and diarrhea. Negative for anal bleeding, blood in stool, nausea, rectal pain and vomiting.        Allergies  Allergies   Allergen Reactions    Aspirin Anaphylaxis    Bee Venom Protein (Honey Bee) Anaphylaxis    Demerol [Meperidine] Anaphylaxis    Iodine Anaphylaxis    Penicillins Anaphylaxis    Sulfa (Sulfonamide Antibiotics) Anaphylaxis    Codeine Unknown       Medications  Current Outpatient Medications   Medication Instructions    albuterol (Ventolin HFA) 90 mcg/actuation inhaler 2 puffs, inhalation, Every 4 hours PRN    clonazePAM (KLONOPIN) 0.5 mg, oral, 3 times daily    dicyclomine (BENTYL) 20 mg, oral, 4 times daily before meals and nightly    fluticasone (Flonase) 50 mcg/actuation nasal spray 1 spray, Each Nostril, Daily, Shake gently. Before first use, prime pump. After use, clean tip and replace cap.    gabapentin (NEURONTIN) 100 mg, oral, Nightly    levoFLOXacin (LEVAQUIN) 750 mg, oral, Daily    metroNIDAZOLE (FLAGYL) 500 mg, oral, 2 times daily    pantoprazole (PROTONIX) 40 mg, oral, Daily, Do not crush, chew, or split.    rimegepant (NURTEC) 75 mg tablet,disintegrating Take 1 tablet by  mouth as needed at onset of headache    SUMAtriptan (IMITREX) 100 mg, oral, Once as needed, May repeat after 2 hours.        Objective   Visit Vitals  Pulse 72      Physical Exam  Constitutional:       Appearance: Normal appearance. She is normal weight.   HENT:      Mouth/Throat:      Mouth: Mucous membranes are dry.      Pharynx: Oropharynx is clear.   Cardiovascular:      Rate and Rhythm: Normal rate and regular rhythm.   Pulmonary:      Effort: Pulmonary effort is normal.      Breath sounds: Normal breath sounds. No wheezing or rhonchi.   Abdominal:      General: Abdomen is flat. Bowel sounds are normal. There is no distension.      Palpations: Abdomen is soft. There is no hepatomegaly.      Tenderness: There is abdominal tenderness (LLQ, LUQ). There is guarding. There is no rebound. Negative signs include Gill's sign.      Hernia: No hernia is present.   Musculoskeletal:         General: Normal range of  motion.   Skin:     General: Skin is warm and dry.   Neurological:      General: No focal deficit present.      Mental Status: She is alert and oriented to person, place, and time.   Psychiatric:         Mood and Affect: Mood normal.         Behavior: Behavior normal.           Lab Results   Component Value Date    WBC 11.8 (H) 03/18/2024    WBC 10.3 11/21/2023    HGB 12.7 03/18/2024    HGB 14.5 11/21/2023    HCT 37.3 03/18/2024    HCT 46.7 (H) 11/21/2023     03/18/2024     11/21/2023     Lab Results   Component Value Date     03/18/2024     11/21/2023    K 4.9 03/18/2024    K 4.9 11/21/2023     03/18/2024     11/21/2023    CO2 25 03/18/2024    CO2 29 11/21/2023    BUN 13 03/18/2024    BUN 17 11/21/2023    CREATININE 0.76 03/18/2024    CREATININE 0.84 11/21/2023    CALCIUM 9.5 03/18/2024    CALCIUM 10.4 11/21/2023    PROT 7.1 03/18/2024    PROT 7.4 11/21/2023    BILITOT 0.6 03/18/2024    BILITOT 0.9 11/21/2023    ALKPHOS 88 03/18/2024    ALKPHOS 84 11/21/2023    ALT 5 (L) 03/18/2024    ALT 13 11/21/2023    AST 17 03/18/2024    AST 20 11/21/2023    GLUCOSE 99 03/18/2024    GLUCOSE 97 11/21/2023           Elida Yoon is a 78 y.o. female who presents to GI clinic for diverticulitis and GERD.    Gastroesophageal reflux disease  Uncontrolled GERD.  Consider gastritis, esophagitis, duodenitis, PUD  -Start 40 mg Protonix daily  -Antireflux regimen  -Consider EGD    Diverticulitis  Unresolved diverticulitis  -Prescribed 14-day course of Flagyl and and Levaquin  -Recommend patient call with update.  Once resolved schedule colonoscopy  -As needed Bentyl.  Patient requires less dose than capful comes in, so liquid prescribed.           Ashley Gonzalez, APRN-CNP

## 2024-06-05 NOTE — ASSESSMENT & PLAN NOTE
Unresolved diverticulitis  -Prescribed 14-day course of Flagyl and and Levaquin  -Recommend patient call with update.  Once resolved schedule colonoscopy  -As needed Bentyl.  Patient requires less dose than capful comes in, so liquid prescribed.

## 2024-06-05 NOTE — PATIENT INSTRUCTIONS
Please take entire course of Flagyl and Levaquin.  Take with food.     Try to minimize acidic foods such as citrus fruits, tomatoes, and pop. Also try to avoid chocolate, peppermint, alcohol, and caffeine.  Avoid eating within 2-3 hours of lying down.   Eat more frequent smaller meals instead of a few large meals.  You can prop the head of your bed up when you are sleeping to decrease reflux.  Please take Pantoprazole 30-60 minutes before a meal daily.  This is to help calm stomach acid.    Let me know if you are not better after finishing antibiotics.  Call or send me a message in 3 weeks to let me know how you are.  We then will discuss an upper endoscopy and colonoscopy

## 2024-06-05 NOTE — ASSESSMENT & PLAN NOTE
Uncontrolled GERD.  Consider gastritis, esophagitis, duodenitis, PUD  -Start 40 mg Protonix daily  -Antireflux regimen  -Consider EGD

## 2024-06-12 ENCOUNTER — DOCUMENTATION (OUTPATIENT)
Dept: BEHAVIORAL HEALTH | Facility: CLINIC | Age: 79
End: 2024-06-12

## 2024-06-12 ENCOUNTER — APPOINTMENT (OUTPATIENT)
Dept: PRIMARY CARE | Facility: CLINIC | Age: 79
End: 2024-06-12
Payer: MEDICARE

## 2024-06-12 ENCOUNTER — PATIENT OUTREACH (OUTPATIENT)
Dept: PRIMARY CARE | Facility: CLINIC | Age: 79
End: 2024-06-12

## 2024-06-12 DIAGNOSIS — E78.00 PURE HYPERCHOLESTEROLEMIA: ICD-10-CM

## 2024-06-12 DIAGNOSIS — F33.9 DEPRESSION, RECURRENT (CMS-HCC): ICD-10-CM

## 2024-06-12 NOTE — PROGRESS NOTES
CCM outreach pt identified by name and .    LOV: 6/3/24  NOV: 24    Health Maintenance Due: Bone Density Scan    Goals for Depression:  Monitor mood and behavior changes.  Administer prescribed antidepressant medication.  Facilitate regular psychotherapy sessions.  Provide education on depression management.  Stick to a daily schedule  Call support group or person   Keep a healthy weight and eat healthy diet options    For Cholesterol:   Treatment goals include:  HIGHER IN FRUITS AND VEGGIES AND WHOLE GRAIN AND LOWER IN FATTY FOODS.     Reviewed warning s/s and advised to seek immediate medical attention if he develops discussed warning sx.    Spoke with pt who states her mood has been good.  Pt spoke with a therapist and felt it went very well and that she may be getting the help she needs if it is covered under her insurance.  Pt states she has been eating well and been out in the community.  Pt does not check her BP and is not going to start.    Medications reviewed no refills requested.  Pt verbalizes understanding and is in agreement with POC.  Encouraged pt to reach out with any healthcare needs.
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
## 2024-06-12 NOTE — PROGRESS NOTES
[facility] Psychiatry Consult Note     Elida Yoon is a 78 y.o., referred to Collaborative Care for symptoms of depression and anxiety. I have reviewed the patient with the behavioral health manager and reviewed the patient's electronic record. Has childhood trauma, social distress, lots of moves, socially isolated since moving to [place], environmental stimuli trigger PTSD symptoms and anxiety, which includes heart racing and sweating.    Current Meds:  Clonazepam 0.5 tid - helps anxiety but still with trouble sleeping only 2-3 hours per night    Past Meds:   Took fluoxetine 3 but made her feel slow, groggy, has tried other things but cannot remember    Recommendations:  For now, continue clonazepam - dose for age are quite high, and uneven half life of clonazepam puts at risk for withdrawal and further complications. Would consider transitioning to lorazepam three times a day. Due to high potency of clonazepam, would probably consider 1mg three times daily of lorazepam.  For nightmares, can consider prazosin 1mg - start with 1mg, and increase by 1mg nightly until nightmares are gone for 7 straight nights. Stop at 5mg, even if nightmares persist. If patient has headaches or dizziness, hold until those side effects persist, then increase per symptoms.  Could consider Cymbalta 20mg daily for PTSD/anxiety - patient with bad side effects, would wait for 6-8 weeks before trying higher dose to see if tolerates. If does tolerate, can titrate by 20mg daily to max of 80mg if tolerate, titrate every 6-8 weeks         The above treatment considerations and suggestions are based on consultations with the patient's care manager and a review of information available in the electronic medical record. I have not personally examined the patient. All recommendations should be implemented with consideration of the patient's relevant prior history and current clinical status. Please feel free to call me with any questions about the  care of this patient.

## 2024-06-13 ENCOUNTER — APPOINTMENT (OUTPATIENT)
Dept: PRIMARY CARE | Facility: CLINIC | Age: 79
End: 2024-06-13
Payer: MEDICARE

## 2024-06-13 DIAGNOSIS — K51.919 ULCERATIVE COLITIS WITH COMPLICATION, UNSPECIFIED LOCATION (MULTI): ICD-10-CM

## 2024-06-17 RX ORDER — DICYCLOMINE HYDROCHLORIDE 10 MG/1
10 CAPSULE ORAL 4 TIMES DAILY
Qty: 120 CAPSULE | Refills: 2 | OUTPATIENT
Start: 2024-06-17 | End: 2024-09-15

## 2024-06-19 ENCOUNTER — APPOINTMENT (OUTPATIENT)
Dept: PRIMARY CARE | Facility: CLINIC | Age: 79
End: 2024-06-19
Payer: MEDICARE

## 2024-06-26 ENCOUNTER — APPOINTMENT (OUTPATIENT)
Dept: PRIMARY CARE | Facility: CLINIC | Age: 79
End: 2024-06-26
Payer: MEDICARE

## 2024-06-26 ENCOUNTER — DOCUMENTATION (OUTPATIENT)
Dept: BEHAVIORAL HEALTH | Facility: CLINIC | Age: 79
End: 2024-06-26

## 2024-06-26 ASSESSMENT — PATIENT HEALTH QUESTIONNAIRE - PHQ9
1. LITTLE INTEREST OR PLEASURE IN DOING THINGS: MORE THAN HALF THE DAYS
10. IF YOU CHECKED OFF ANY PROBLEMS, HOW DIFFICULT HAVE THESE PROBLEMS MADE IT FOR YOU TO DO YOUR WORK, TAKE CARE OF THINGS AT HOME, OR GET ALONG WITH OTHER PEOPLE: SOMEWHAT DIFFICULT
SUM OF ALL RESPONSES TO PHQ9 QUESTIONS 1 & 2: 3
5. POOR APPETITE OR OVEREATING: NOT AT ALL
4. FEELING TIRED OR HAVING LITTLE ENERGY: MORE THAN HALF THE DAYS
9. THOUGHTS THAT YOU WOULD BE BETTER OFF DEAD, OR OF HURTING YOURSELF: NOT AT ALL
8. MOVING OR SPEAKING SO SLOWLY THAT OTHER PEOPLE COULD HAVE NOTICED. OR THE OPPOSITE, BEING SO FIGETY OR RESTLESS THAT YOU HAVE BEEN MOVING AROUND A LOT MORE THAN USUAL: NOT AT ALL
6. FEELING BAD ABOUT YOURSELF - OR THAT YOU ARE A FAILURE OR HAVE LET YOURSELF OR YOUR FAMILY DOWN: NOT AT ALL
3. TROUBLE FALLING OR STAYING ASLEEP: NEARLY EVERY DAY
7. TROUBLE CONCENTRATING ON THINGS, SUCH AS READING THE NEWSPAPER OR WATCHING TELEVISION: NOT AT ALL
2. FEELING DOWN, DEPRESSED OR HOPELESS: SEVERAL DAYS
SUM OF ALL RESPONSES TO PHQ QUESTIONS 1-9: 8

## 2024-06-26 ASSESSMENT — ANXIETY QUESTIONNAIRES
GAD7 TOTAL SCORE: 12
IF YOU CHECKED OFF ANY PROBLEMS ON THIS QUESTIONNAIRE, HOW DIFFICULT HAVE THESE PROBLEMS MADE IT FOR YOU TO DO YOUR WORK, TAKE CARE OF THINGS AT HOME, OR GET ALONG WITH OTHER PEOPLE: SOMEWHAT DIFFICULT
1. FEELING NERVOUS, ANXIOUS, OR ON EDGE: NEARLY EVERY DAY
5. BEING SO RESTLESS THAT IT IS HARD TO SIT STILL: NOT AT ALL
3. WORRYING TOO MUCH ABOUT DIFFERENT THINGS: NEARLY EVERY DAY
6. BECOMING EASILY ANNOYED OR IRRITABLE: NOT AT ALL
2. NOT BEING ABLE TO STOP OR CONTROL WORRYING: NEARLY EVERY DAY
7. FEELING AFRAID AS IF SOMETHING AWFUL MIGHT HAPPEN: NOT AT ALL
4. TROUBLE RELAXING: NEARLY EVERY DAY

## 2024-06-26 NOTE — PROGRESS NOTES
"Collaborative Care (CoCM)  Progress Note    Type of Interaction: In Office    Start Time: 9:00 AM    End Time: 11:00 AM        Appointment: Scheduled    Reason for Visit: No chief complaint on file.   Pt presents with appropriate affect and mood.   Pt states she has been having worsening anxiety.  Pt reports she has been not \"wanting to talk to anybody\"  and having overwhelming thoughts throughout the day and prior to going to sleep.   Pt reports she use to like engaging in nature and currently does not have the desire to do so.   Pt states she still has concern over her son not being happy with his job.  BHM and pt processed interventions to utilize to express her feelings about her son not being happy (journaling, write a letter and not mail). Pt agreed.  Pt also reports the people and situations that trigger her PTSD/anxiety.  BHM processed with pt exposure therapy.  Pt agreed to engage in painting art since painting is a trigger for her.  BHM also discussed with pt writing \"letters\" to her  spouse and grandfather  as a form of expressing her feelings and emotions to them to try to gain closure.  Pt agreed.  BHM completed PCLC, GAD7 and PHQ9.         Interval History / Patient Symptoms:     No data recorded      Interventions Provided:  developing coping skills      Progress Made: minimum    Response to Intervention: Pt was calm and cooperative during session.  Pt was receptive to the interventions/coping skills that were discussed and recommended.  Pt was able to discussed her sx of PTSD anxiety and depression appropriately.          Plan:     Care Plan    There is no care plan documentation to display.         There are no Patient Instructions on file for this visit.      Follow Up / Next Appointment:   9:00am      "

## 2024-06-26 NOTE — PROGRESS NOTES
[facility] Psychiatry Consult Note     Elida Yoon is a 78 y.o., referred to Collaborative Care for symptoms of depression and anxiety. I have reviewed the patient with the behavioral health manager and reviewed the patient's electronic record. MH symptom batteries done, PCL did show scores in the fifties.    Recommendations:   Made recommendations in last note - patient has follow up on the 11th to discuss meds with Dr Jc       Patient Health Questionnaire-9 Score: 8 (6/26/2024  9:54 AM)  CHAPARRO-7 Total Score: 12 (6/26/2024 10:02 AM)      The above treatment considerations and suggestions are based on consultations with the patient's care manager and a review of information available in the electronic medical record. I have not personally examined the patient. All recommendations should be implemented with consideration of the patient's relevant prior history and current clinical status. Please feel free to call me with any questions about the care of this patient.

## 2024-06-27 ENCOUNTER — DOCUMENTATION (OUTPATIENT)
Dept: PRIMARY CARE | Facility: CLINIC | Age: 79
End: 2024-06-27
Payer: MEDICARE

## 2024-06-27 DIAGNOSIS — F32.9 MAJOR DEPRESSIVE DISORDER WITH CURRENT ACTIVE EPISODE, UNSPECIFIED DEPRESSION EPISODE SEVERITY, UNSPECIFIED WHETHER RECURRENT: ICD-10-CM

## 2024-06-27 DIAGNOSIS — F43.10 POST TRAUMATIC STRESS DISORDER: Primary | ICD-10-CM

## 2024-06-27 DIAGNOSIS — F41.9 ANXIETY: ICD-10-CM

## 2024-07-09 ENCOUNTER — PATIENT OUTREACH (OUTPATIENT)
Dept: PRIMARY CARE | Facility: CLINIC | Age: 79
End: 2024-07-09

## 2024-07-09 ENCOUNTER — APPOINTMENT (OUTPATIENT)
Dept: PRIMARY CARE | Facility: CLINIC | Age: 79
End: 2024-07-09
Payer: MEDICARE

## 2024-07-09 DIAGNOSIS — F32.9 MAJOR DEPRESSIVE DISORDER WITH CURRENT ACTIVE EPISODE, UNSPECIFIED DEPRESSION EPISODE SEVERITY, UNSPECIFIED WHETHER RECURRENT: ICD-10-CM

## 2024-07-09 DIAGNOSIS — E78.00 PURE HYPERCHOLESTEROLEMIA: ICD-10-CM

## 2024-07-09 NOTE — PROGRESS NOTES
CCM outreach with pt identified by name and .    LOV: 24  NOV: 24    Health Maintenance Due: Bone Density Scan    Goals for Depression:  Monitor mood and behavior changes.  Administer prescribed antidepressant medication.  Facilitate regular psychotherapy sessions.  Provide education on depression management.  Stick to a daily schedule  Call support group or person   Keep a healthy weight and eat healthy diet options    For Cholesterol:   Treatment goals include:  HIGHER IN FRUITS AND VEGGIES AND WHOLE GRAIN AND LOWER IN FATTY FOODS.     Reviewed warning s/s and advised to seek immediate medical attention if he develops discussed warning sx.    Spoke with pt who states she is doing well.  Pt states she is talking with some people she feels are starting to help her with her mental state.  Pt has PTSD as a diagnosis and she feels that is vital to her treatment.Pt states her mood has been stable and she has been eating better.     Medications reviewed no refills requested.  Pt verbalizes understanding and is in agreement with POC.  Encouraged pt to reach out with any healthcare concerns.
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
## 2024-07-11 ENCOUNTER — APPOINTMENT (OUTPATIENT)
Dept: PRIMARY CARE | Facility: CLINIC | Age: 79
End: 2024-07-11
Payer: MEDICARE

## 2024-07-11 DIAGNOSIS — F51.5 NIGHTMARES: ICD-10-CM

## 2024-07-11 DIAGNOSIS — F41.9 ANXIETY: ICD-10-CM

## 2024-07-11 DIAGNOSIS — K51.919 ULCERATIVE COLITIS WITH COMPLICATION, UNSPECIFIED LOCATION (MULTI): ICD-10-CM

## 2024-07-11 DIAGNOSIS — G43.109 MIGRAINE WITH AURA AND WITHOUT STATUS MIGRAINOSUS, NOT INTRACTABLE: Primary | ICD-10-CM

## 2024-07-11 DIAGNOSIS — K57.92 DIVERTICULITIS: ICD-10-CM

## 2024-07-11 PROCEDURE — 1160F RVW MEDS BY RX/DR IN RCRD: CPT | Performed by: INTERNAL MEDICINE

## 2024-07-11 PROCEDURE — 1036F TOBACCO NON-USER: CPT | Performed by: INTERNAL MEDICINE

## 2024-07-11 PROCEDURE — 99443 PR PHYS/QHP TELEPHONE EVALUATION 21-30 MIN: CPT | Performed by: INTERNAL MEDICINE

## 2024-07-11 PROCEDURE — 1159F MED LIST DOCD IN RCRD: CPT | Performed by: INTERNAL MEDICINE

## 2024-07-11 RX ORDER — DICYCLOMINE HYDROCHLORIDE 20 MG/1
20 TABLET ORAL
Qty: 120 TABLET | Refills: 11 | Status: SHIPPED | OUTPATIENT
Start: 2024-07-11 | End: 2025-07-11

## 2024-07-11 NOTE — ASSESSMENT & PLAN NOTE
Currently on clonazepam, renewed Dr. Johansen's note regarding considering to switch to lorazepam. She is unsure if she has been on this in the past but would be reasonable to start.  They have signed a contract , 7/27.   Will reach out to psychiatry to ensure a safe transition to lorazepam  Has been on Cymbalta in the past and is hesitant to start particular in the setting of postinfectious diarrhea.

## 2024-07-11 NOTE — ASSESSMENT & PLAN NOTE
No response to triptan, start CGRP antagonist but issues with insurance coverage will be sent to pharmacy team for assistance.   multiple intolerances to medication in the past hesitant to initiate a medication for prevention.  Trial of magnesium and riboflavin, has not yet initiated encouraged to try

## 2024-07-11 NOTE — ASSESSMENT & PLAN NOTE
Has not yet tried nurtec due to insurance purposes, will reach out to pharmacy team for assistance with coverage options  Has not yet tried riboflavin and magnesium, encouraged to try.  Orders:    Follow Up In [facility] - Pharmacy; Future

## 2024-07-11 NOTE — ASSESSMENT & PLAN NOTE
Now complicated by postinfectious diarrhea without alarm symptoms   Continue supportive measures for now   Rx bentyl uptitrated dose in tablet form - will cut into four   Warning signs reviewed.   Orders:    dicyclomine (Bentyl) 20 mg tablet; Take 1 tablet (20 mg) by mouth 4 times a day before meals.    Follow Up In [facility] - Pharmacy; Future

## 2024-07-19 ENCOUNTER — APPOINTMENT (OUTPATIENT)
Dept: PHARMACY | Facility: HOSPITAL | Age: 79
End: 2024-07-19
Payer: MEDICARE

## 2024-08-01 ENCOUNTER — HOSPITAL ENCOUNTER (OUTPATIENT)
Dept: RADIOLOGY | Facility: CLINIC | Age: 79
Discharge: HOME | End: 2024-08-01
Payer: MEDICARE

## 2024-08-01 VITALS — BODY MASS INDEX: 21.09 KG/M2 | HEIGHT: 63 IN | WEIGHT: 119 LBS

## 2024-08-01 DIAGNOSIS — Z12.31 SCREENING MAMMOGRAM FOR BREAST CANCER: ICD-10-CM

## 2024-08-01 PROCEDURE — 77067 SCR MAMMO BI INCL CAD: CPT

## 2024-08-08 ENCOUNTER — APPOINTMENT (OUTPATIENT)
Dept: PHARMACY | Facility: HOSPITAL | Age: 79
End: 2024-08-08
Payer: MEDICARE

## 2024-08-08 DIAGNOSIS — G43.109 MIGRAINE WITH AURA AND WITHOUT STATUS MIGRAINOSUS, NOT INTRACTABLE: ICD-10-CM

## 2024-08-08 NOTE — PROGRESS NOTES
Clinical Pharmacy Appointment    Patient ID: Elida Yoon is a 78 y.o. female who presents for Migraine.    Pt is here for First appointment.     Referring Provider: Diana Jc DO  PCP: Diana Jc DO   Last visit with PCP: 7/11/2024   Next visit with PCP: 9/18/2024      Subjective      HPI    Patient is a 78 y.o. female who presents to an initial clinical pharmacy visit for migraine management. She has 10-15 migraine days per month with nausea and aura. She previously tried sumatriptan for ~6 months with minimal relief. She currently takes OTC tylenol and ices her head for management with no relief.     MIGRAINE  Does patient follow with neurology?: No  Current migraine medications include:  Sumatriptan 100 mg taking 1 tablet 1 time PRN migraine   Tylenol 325 mg q6h PRN migrain  Clarifications to above regimen: Stopped sumatriptan due to no relief   Adverse Effects: None  Assessment of Severity  Monthly Headache Days: 10-15 days per month   Previous MHDs: N/A  Acute Treatment Frequency: Tylenol daily    HURT Assessment  1. On how many days in the last month did you have a headache? 15 days  2. On how many days in the last three months did your headaches make it hard to work, study,  or carry out household work? Patient reported she only has herself so she gets things done but struggles. She also has fibromyalgia which makes things difficult. 3. On how many days in the last three months did your headaches spoil or prevent your family,  social or leisure activities? 10-15 days   4. On how many days in the last month did you take medication to relieve a headache? Tyneol daily -- does not help   5. When you take your headache medication, does one dose get rid of your headache and  keep it away? No  6. Do you feel in control of your headaches? No  7. Do you avoid or delay taking your headache medication because you do not like its sideeffects?   No -- stopped sumatriptan due to no relief     mTOQ-6  Assessment  1. Are you able to quickly return to your normal activities after taking your migraine  medication? 3  2. After taking your migraine medication, are you pain free within 2 hours for most attacks? 1  3. Does one dose of your migraine medication usually relieve your headache and keep it away  for at least 24 hours?  1  4. Is your migraine medication well tolerated? 4  5. Are you comfortable enough with your migraine medication to be able to plan your daily  activities? 3   6. After taking your migraine medication, do you feel in control of your migraines enough so  that you feel there will be no disruption to your daily activities? 1  Current mTOQ-6 Score: 12  Previous Score: N/A    Drug Interactions  No relevant drug interactions were noted.    Medication System Management  Patient's preferred pharmacy: [facility] [place], [place]  Adherence/Organization: Yes  Affordability/Accessibility: Nurtec not covered -- PA required      Objective   Allergies   Allergen Reactions    Aspirin Anaphylaxis    Bee Venom Protein (Honey Bee) Anaphylaxis    Demerol [Meperidine] Anaphylaxis    Iodine Anaphylaxis    Penicillins Anaphylaxis    Sulfa (Sulfonamide Antibiotics) Anaphylaxis    Codeine Unknown     Social History     Social History Narrative    Not on file      Medication Review  Current Outpatient Medications   Medication Instructions    albuterol (Ventolin HFA) 90 mcg/actuation inhaler 2 puffs, inhalation, Every 4 hours PRN    clonazePAM (KLONOPIN) 0.5 mg, oral, 3 times daily    dicyclomine (BENTYL) 20 mg, oral, 4 times daily before meals and nightly    fluticasone (Flonase) 50 mcg/actuation nasal spray 1 spray, Each Nostril, Daily, Shake gently. Before first use, prime pump. After use, clean tip and replace cap.    gabapentin (NEURONTIN) 100 mg, oral, Nightly    ondansetron (Zofran) 4 mg tablet Take 1 tablet (4 mg) by mouth every 8 hours as needed for nausea    pantoprazole (PROTONIX) 40 mg, oral, Daily, Do  "not crush, chew, or split.    rimegepant (NURTEC) 75 mg tablet,disintegrating Take 1 tablet by  mouth as needed at onset of headache    SUMAtriptan (IMITREX) 100 mg, oral, Once as needed, May repeat after 2 hours.      Vitals  BP Readings from Last 2 Encounters:   06/03/24 148/66   03/18/24 144/70     BMI Readings from Last 1 Encounters:   08/01/24 21.08 kg/m²      Labs  A1C  No results found for: \"HGBA1C\"  BMP  Lab Results   Component Value Date    CALCIUM 9.5 03/18/2024     03/18/2024    K 4.9 03/18/2024    CO2 25 03/18/2024     03/18/2024    BUN 13 03/18/2024    CREATININE 0.76 03/18/2024    EGFR 81 03/18/2024     LFTs  Lab Results   Component Value Date    ALT 5 (L) 03/18/2024    AST 17 03/18/2024    ALKPHOS 88 03/18/2024    BILITOT 0.6 03/18/2024     FLP  Lab Results   Component Value Date    TRIG 132 11/21/2023    CHOL 327 (H) 11/21/2023    LDLCALC 211 (H) 11/21/2023    HDL 89.6 11/21/2023     Urine Microalbumin  No results found for: \"MICROALBCREA\"  Weight Management  Wt Readings from Last 3 Encounters:   08/01/24 54 kg (119 lb)   06/05/24 54 kg (119 lb)   06/03/24 54.1 kg (119 lb 3.2 oz)      There is no height or weight on file to calculate BMI.     Education  Patient asked about using Botox to help with migraine relief. Discussed how it is a last line treatment for patients with >15 migraine days per month. Due to her being around that 15 day tulio it could be an option in the future to look into if she does not see success with Nurtec.   Counseled patient on MOA, expectations, side effects, duration of therapy, contraindications, administration, and monitoring parameters  Answered all patient questions and concerns    Assessment/Plan   Problem List Items Addressed This Visit       Migraine with aura     START Nurtec ODT once PA is approved  PA KEY  Sent prescription for Zofran 4 mg take 1 tablet q8h PRN nausea due to significant nausea from migraine to [facility]   CONTINUE healthy lifestyle "   CONTINUE following up with PCP as directed   Provided patient with [facility] phone number for any additional questions or concerns [phone]         Relevant Medications    ondansetron (Zofran) 4 mg tablet    Other Relevant Orders    Follow Up In Advanced Primary Care - Pharmacy     Follow up with Clinical Pharmacy Team 8/15/2024 at 3pm    Time spent with pt: Total length of time 30 (minutes) of the encounter and more than 50% was spent counseling the patient.    Continue all meds under the continuation of care with the referring provider and clinical pharmacy team.    Please reach out to the Clinical Pharmacy Team if there are any further questions.     Verbal consent to manage patient's drug therapy was obtained from patient. They were informed they may decline to participate or withdraw from participation in pharmacy services at any time.    Daina Johnson, PharmD  Clinical Pharmacy Specialist   [phone]

## 2024-08-09 ENCOUNTER — PATIENT OUTREACH (OUTPATIENT)
Dept: PRIMARY CARE | Facility: CLINIC | Age: 79
End: 2024-08-09
Payer: MEDICARE

## 2024-08-09 DIAGNOSIS — E78.00 PURE HYPERCHOLESTEROLEMIA: ICD-10-CM

## 2024-08-09 DIAGNOSIS — F32.9 MAJOR DEPRESSIVE DISORDER WITH CURRENT ACTIVE EPISODE, UNSPECIFIED DEPRESSION EPISODE SEVERITY, UNSPECIFIED WHETHER RECURRENT: ICD-10-CM

## 2024-08-09 RX ORDER — ONDANSETRON 4 MG/1
TABLET, FILM COATED ORAL
Qty: 20 TABLET | Refills: 0 | Status: SHIPPED | OUTPATIENT
Start: 2024-08-09

## 2024-08-09 NOTE — ASSESSMENT & PLAN NOTE
START Nurtec ODT once PA is approved  PA KEY: [id number]  Sent prescription for Zofran 4 mg take 1 tablet q8h PRN nausea due to significant nausea from migraine to [facility]   CONTINUE healthy lifestyle   CONTINUE following up with PCP as directed   Provided patient with [facility] phone number for any additional questions or concerns [phone]

## 2024-08-09 NOTE — PROGRESS NOTES
CCM outreach with pt identified by name and .    LOV: 24  NOV: 24    Health Maintenance Due: up to date    Goals for Depression:  Monitor mood and behavior changes.  Administer prescribed antidepressant medication.  Facilitate regular psychotherapy sessions.  Provide education on depression management.  Stick to a daily schedule  Call support group or person   Keep a healthy weight and eat healthy diet options    For Cholesterol:   Treatment goals include:  HIGHER IN FRUITS AND VEGGIES AND WHOLE GRAIN AND LOWER IN FATTY FOODS.     Reviewed warning s/s and advised to seek immediate medical attention if he develops discussed warning sx.    Spoke with pt who states she is in good spirits.  Pt states she has been out in the community and has made friends.  Pt states she has been eating better.      Medications reviewed no refills requested.  Pt verbalizes understanding and is in agreement with the POC.  Encouraged pt to reach out with any healthcare needs.

## 2024-08-15 ENCOUNTER — APPOINTMENT (OUTPATIENT)
Dept: PHARMACY | Facility: HOSPITAL | Age: 79
End: 2024-08-15
Payer: MEDICARE

## 2024-08-22 ENCOUNTER — APPOINTMENT (OUTPATIENT)
Dept: PHARMACY | Facility: HOSPITAL | Age: 79
End: 2024-08-22
Payer: MEDICARE

## 2024-08-22 DIAGNOSIS — G43.109 MIGRAINE WITH AURA AND WITHOUT STATUS MIGRAINOSUS, NOT INTRACTABLE: ICD-10-CM

## 2024-08-22 NOTE — ASSESSMENT & PLAN NOTE
START Nurtec ODT 75 mg taking 1 tablet PRN at onset of headache  Prescription sent to [facility] for mail order once  PAP approved  Send [facility] tax documents for  PAP application  CONTINUE using Zofran 4 mg q8h PRN nausea with migraine  CONTINUE making healthy lifestyle choices  Provided patient with [facility] phone number for any additional questions or concerns

## 2024-08-22 NOTE — PROGRESS NOTES
Clinical Pharmacy Appointment    Patient ID: Elida Yoon is a 78 y.o. female who presents for Migraine.    Pt is here for First appointment.     Referring Provider: Diana Jc DO  PCP: Diana Jc DO   Last visit with PCP: 7/11/2024   Next visit with PCP: 9/18/2024    Elida Yoon has been approved for prior authorization for Nurtec ODT   Key: [id number]    Please contact clinical pharmacy with any further questions. Thank you.    Subjective      HPI    Patient is a 78 y.o. female who presents to a follow up clinical pharmacy visit for migraine management. She has 10-15 migraine days per month with nausea and aura. She previously tried sumatriptan for ~6 months with minimal relief. She currently takes OTC tylenol and ices her head for management with no relief.    Nurte PA approved still >$1000/month.      Patient Assistance Screening (VAF)  Patient verbally reports monthly or yearly income which is less than 400% federal poverty level  Application for program has been submitted for the following medications:   Nurtec 75 mg ODT  Patient aware this process may take up to 2 weeks once income documents have been sent to the team.  If approved, medication must be filled through [facility] pharmacy and may be picked up or mailed to patient.   If approved, medication will be billed through insurance, and patient assistance team will pay the copay. This will result in a $0 copay for the patient.  Counseled patient on mechanism of action, side effects, contraindications, and what to do if the patient misses a dose. All patients questions were answered.     MIGRAINE  Does patient follow with neurology?: No  Current migraine medications include:  Sumatriptan 100 mg taking 1 tablet 1 time PRN migraine   Tylenol 325 mg q6h PRN migrain  Clarifications to above regimen: Stopped sumatriptan due to no relief   Adverse Effects: None  Assessment of Severity  Monthly Headache Days: 10-15 days per month   Previous  MHDs: N/A  Acute Treatment Frequency: Tylenol daily    HURT Assessment  1. On how many days in the last month did you have a headache? 15 days  2. On how many days in the last three months did your headaches make it hard to work, study,  or carry out household work? Patient reported she only has herself so she gets things done but struggles. She also has fibromyalgia which makes things difficult. 3. On how many days in the last three months did your headaches spoil or prevent your family,  social or leisure activities? 10-15 days   4. On how many days in the last month did you take medication to relieve a headache? Tyneol daily -- does not help   5. When you take your headache medication, does one dose get rid of your headache and  keep it away? No  6. Do you feel in control of your headaches? No  7. Do you avoid or delay taking your headache medication because you do not like its sideeffects?   No -- stopped sumatriptan due to no relief     mTOQ-6 Assessment  1. Are you able to quickly return to your normal activities after taking your migraine  medication? 3  2. After taking your migraine medication, are you pain free within 2 hours for most attacks? 1  3. Does one dose of your migraine medication usually relieve your headache and keep it away  for at least 24 hours?  1  4. Is your migraine medication well tolerated? 4  5. Are you comfortable enough with your migraine medication to be able to plan your daily  activities? 3   6. After taking your migraine medication, do you feel in control of your migraines enough so  that you feel there will be no disruption to your daily activities? 1  Current mTOQ-6 Score: 12  Previous Score: N/A    Drug Interactions  No relevant drug interactions were noted.    Medication System Management  Patient's preferred pharmacy: [facility] [place], [place]  Adherence/Organization: Yes  Affordability/Accessibility: Nurtec PA approved still costly       Objective   Allergies   Allergen  "Reactions    Aspirin Anaphylaxis    Bee Venom Protein (Honey Bee) Anaphylaxis    Demerol [Meperidine] Anaphylaxis    Iodine Anaphylaxis    Penicillins Anaphylaxis    Sulfa (Sulfonamide Antibiotics) Anaphylaxis    Codeine Unknown     Social History     Social History Narrative    Not on file      Medication Review  Current Outpatient Medications   Medication Instructions    albuterol (Ventolin HFA) 90 mcg/actuation inhaler 2 puffs, inhalation, Every 4 hours PRN    clonazePAM (KLONOPIN) 0.5 mg, oral, 3 times daily    dicyclomine (BENTYL) 20 mg, oral, 4 times daily before meals and nightly    fluticasone (Flonase) 50 mcg/actuation nasal spray 1 spray, Each Nostril, Daily, Shake gently. Before first use, prime pump. After use, clean tip and replace cap.    gabapentin (NEURONTIN) 100 mg, oral, Nightly    ondansetron (Zofran) 4 mg tablet Take 1 tablet (4 mg) by mouth every 8 hours as needed for nausea    pantoprazole (PROTONIX) 40 mg, oral, Daily, Do not crush, chew, or split.    rimegepant (NURTEC) 75 mg tablet,disintegrating Take 1 tablet by mouth once daily as needed at onset of headache    SUMAtriptan (IMITREX) 100 mg, oral, Once as needed, May repeat after 2 hours.      Vitals  BP Readings from Last 2 Encounters:   06/03/24 148/66   03/18/24 144/70     BMI Readings from Last 1 Encounters:   08/01/24 21.08 kg/m²      Labs  A1C  No results found for: \"HGBA1C\"  BMP  Lab Results   Component Value Date    CALCIUM 9.5 03/18/2024     03/18/2024    K 4.9 03/18/2024    CO2 25 03/18/2024     03/18/2024    BUN 13 03/18/2024    CREATININE 0.76 03/18/2024    EGFR 81 03/18/2024     LFTs  Lab Results   Component Value Date    ALT 5 (L) 03/18/2024    AST 17 03/18/2024    ALKPHOS 88 03/18/2024    BILITOT 0.6 03/18/2024     FLP  Lab Results   Component Value Date    TRIG 132 11/21/2023    CHOL 327 (H) 11/21/2023    LDLCALC 211 (H) 11/21/2023    HDL 89.6 11/21/2023     Urine Microalbumin  No results found for: " "\"MICROALBCREA\"  Weight Management  Wt Readings from Last 3 Encounters:   08/01/24 54 kg (119 lb)   06/05/24 54 kg (119 lb)   06/03/24 54.1 kg (119 lb 3.2 oz)      There is no height or weight on file to calculate BMI.     Education  Patient asked about using Botox to help with migraine relief. Discussed how it is a last line treatment for patients with >15 migraine days per month. Due to her being around that 15 day tulio it could be an option in the future to look into if she does not see success with Nurtec.   Counseled patient on MOA, expectations, side effects, duration of therapy, contraindications, administration, and monitoring parameters  Answered all patient questions and concerns    Assessment/Plan   Problem List Items Addressed This Visit       Migraine with aura     START Nurtec ODT 75 mg taking 1 tablet PRN at onset of headache  Prescription sent to [facility] for mail order once  PAP approved  Send [facility] tax documents for  PAP application  CONTINUE using Zofran 4 mg q8h PRN nausea with migraine  CONTINUE making healthy lifestyle choices  Provided patient with [facility] phone number for any additional questions or concerns         Relevant Medications    rimegepant (NURTEC) 75 mg tablet,disintegrating    Other Relevant Orders    Follow Up In Advanced Primary Care - Pharmacy     Follow up with Clinical Pharmacy Team 9/26/2024 at 11am     Time spent with pt: Total length of time 30 (minutes) of the encounter and more than 50% was spent counseling the patient.    Continue all meds under the continuation of care with the referring provider and clinical pharmacy team.    Please reach out to the Clinical Pharmacy Team if there are any further questions.     Verbal consent to manage patient's drug therapy was obtained from patient. They were informed they may decline to participate or withdraw from participation in pharmacy services at any time.    Daina Johnson, GurdeepD  Clinical Pharmacy Specialist "   [phone]

## 2024-09-11 ENCOUNTER — PATIENT OUTREACH (OUTPATIENT)
Dept: PRIMARY CARE | Facility: CLINIC | Age: 79
End: 2024-09-11
Payer: MEDICARE

## 2024-09-11 DIAGNOSIS — E78.00 PURE HYPERCHOLESTEROLEMIA: ICD-10-CM

## 2024-09-11 DIAGNOSIS — F32.9 MAJOR DEPRESSIVE DISORDER WITH CURRENT ACTIVE EPISODE, UNSPECIFIED DEPRESSION EPISODE SEVERITY, UNSPECIFIED WHETHER RECURRENT: ICD-10-CM

## 2024-09-11 NOTE — PROGRESS NOTES
CCM outreach with pt identified by name and .    LOV: 24  NOV: 24    Health Maintenance Due: up to date    Goals for Depression:  Monitor mood and behavior changes.  Administer prescribed antidepressant medication.  Facilitate regular psychotherapy sessions.  Provide education on depression management.  Stick to a daily schedule  Call support group or person   Keep a healthy weight and eat healthy diet options    For Cholesterol:   Treatment goals include:  HIGHER IN FRUITS AND VEGGIES AND WHOLE GRAIN AND LOWER IN FATTY FOODS.     Reviewed warning s/s and advised to seek immediate medical attention if he develops discussed warning sx.    Spoke with pt who states she is doing well.  Pt states her mood is ok it goes up and down at times.  Pt states she is eating well.  Reviewed healthy diet options whole foods as much as possible.     Medications reviewed no questions or refills requested.  Pt verbalizes understanding and is in agreement with the POC.  Encouraged pt to reach out with any healthcare needs.

## 2024-09-18 ENCOUNTER — APPOINTMENT (OUTPATIENT)
Dept: PRIMARY CARE | Facility: CLINIC | Age: 79
End: 2024-09-18
Payer: MEDICARE

## 2024-09-18 VITALS
WEIGHT: 120 LBS | DIASTOLIC BLOOD PRESSURE: 85 MMHG | SYSTOLIC BLOOD PRESSURE: 135 MMHG | BODY MASS INDEX: 21.26 KG/M2 | HEART RATE: 77 BPM | OXYGEN SATURATION: 95 %

## 2024-09-18 DIAGNOSIS — F41.9 ANXIETY: ICD-10-CM

## 2024-09-18 DIAGNOSIS — G43.109 MIGRAINE WITH AURA AND WITHOUT STATUS MIGRAINOSUS, NOT INTRACTABLE: ICD-10-CM

## 2024-09-18 DIAGNOSIS — F33.9 DEPRESSION, RECURRENT (CMS-HCC): ICD-10-CM

## 2024-09-18 DIAGNOSIS — K51.919 ULCERATIVE COLITIS WITH COMPLICATION, UNSPECIFIED LOCATION (MULTI): Primary | ICD-10-CM

## 2024-09-18 PROCEDURE — 1125F AMNT PAIN NOTED PAIN PRSNT: CPT | Performed by: INTERNAL MEDICINE

## 2024-09-18 PROCEDURE — 1159F MED LIST DOCD IN RCRD: CPT | Performed by: INTERNAL MEDICINE

## 2024-09-18 PROCEDURE — 1160F RVW MEDS BY RX/DR IN RCRD: CPT | Performed by: INTERNAL MEDICINE

## 2024-09-18 PROCEDURE — 99214 OFFICE O/P EST MOD 30 MIN: CPT | Performed by: INTERNAL MEDICINE

## 2024-09-18 PROCEDURE — 1036F TOBACCO NON-USER: CPT | Performed by: INTERNAL MEDICINE

## 2024-09-18 PROCEDURE — G2211 COMPLEX E/M VISIT ADD ON: HCPCS | Performed by: INTERNAL MEDICINE

## 2024-09-18 RX ORDER — CEFUROXIME AXETIL 250 MG/1
250 TABLET ORAL EVERY 12 HOURS
COMMUNITY
Start: 2023-09-23 | End: 2024-09-18 | Stop reason: WASHOUT

## 2024-09-18 RX ORDER — CHLORHEXIDINE GLUCONATE ORAL RINSE 1.2 MG/ML
SOLUTION DENTAL
COMMUNITY
Start: 2023-10-02 | End: 2024-09-18 | Stop reason: WASHOUT

## 2024-09-18 RX ORDER — CLINDAMYCIN HYDROCHLORIDE 300 MG/1
1 CAPSULE ORAL EVERY 6 HOURS
COMMUNITY
Start: 2023-09-23 | End: 2024-09-18 | Stop reason: WASHOUT

## 2024-09-18 RX ORDER — PREGABALIN 25 MG/1
25 CAPSULE ORAL
COMMUNITY
Start: 2023-01-17

## 2024-09-18 RX ORDER — CLONAZEPAM 0.5 MG/1
0.5 TABLET ORAL 3 TIMES DAILY
Qty: 90 TABLET | Refills: 3 | Status: SHIPPED | OUTPATIENT
Start: 2024-09-18 | End: 2024-12-17

## 2024-09-18 RX ORDER — FLUTICASONE PROPIONATE 50 MCG
SPRAY, SUSPENSION (ML) NASAL
COMMUNITY
Start: 2022-11-08

## 2024-09-18 RX ORDER — CEPHALEXIN 500 MG/1
CAPSULE ORAL
COMMUNITY
Start: 2023-10-24 | End: 2024-09-18 | Stop reason: WASHOUT

## 2024-09-18 RX ORDER — DULOXETIN HYDROCHLORIDE 20 MG/1
20 CAPSULE, DELAYED RELEASE ORAL DAILY
Qty: 30 CAPSULE | Refills: 0 | Status: SHIPPED | OUTPATIENT
Start: 2024-09-18 | End: 2024-10-18

## 2024-09-18 ASSESSMENT — PATIENT HEALTH QUESTIONNAIRE - PHQ9
10. IF YOU CHECKED OFF ANY PROBLEMS, HOW DIFFICULT HAVE THESE PROBLEMS MADE IT FOR YOU TO DO YOUR WORK, TAKE CARE OF THINGS AT HOME, OR GET ALONG WITH OTHER PEOPLE: SOMEWHAT DIFFICULT
1. LITTLE INTEREST OR PLEASURE IN DOING THINGS: NOT AT ALL
SUM OF ALL RESPONSES TO PHQ9 QUESTIONS 1 AND 2: 2
2. FEELING DOWN, DEPRESSED OR HOPELESS: MORE THAN HALF THE DAYS

## 2024-09-18 ASSESSMENT — ENCOUNTER SYMPTOMS
LOSS OF SENSATION IN FEET: 0
DEPRESSION: 1
OCCASIONAL FEELINGS OF UNSTEADINESS: 1

## 2024-09-18 ASSESSMENT — PAIN SCALES - GENERAL: PAINLEVEL: 7

## 2024-09-18 NOTE — ASSESSMENT & PLAN NOTE
Recurrent symptoms with anxiety, social anxiety and PTSD now worsened since her move to [place] which provokes PTSD symptoms.   Interested in establishment of care with alternate behavioral therapist, resources provided.   Would now be amenable to initiation of daily medication, does not recall past intolerance to cymbalta.   Will start low dose 20mg for the next several weeks.   Potential side effects and management expectations reviewed including GI disturbance, risk of suicidality, insomnia or drowsiness, dizziness, headache.

## 2024-09-18 NOTE — PATIENT INSTRUCTIONS
Elida,   Followup with gastroenterology   Followup with dermatology   Anxiety/PTSD - start CYMBALTA 20mg once per evening.   Call the behavioral health resources as discussed   Bring in tax documents for Nurtec.   Reflux/stomach concerns  Restart PANTOPRAZOLE 40mg once per day, 30-60 minutes before eating.   It may take several days for it to start working.   Followup 3 months

## 2024-09-18 NOTE — ASSESSMENT & PLAN NOTE
No response to triptan, still with recurrent headaches despite attempts at preventive measures, multiple medication intolerances   Has been responsive to nurtec, reached out to pharmacy team, needs to submit information so this may be covered under PAT.   She will bring these documents into the office.
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
## 2024-09-18 NOTE — ASSESSMENT & PLAN NOTE
Currently on clonazepam TID with no mood lability and has chronically been stable on this medication without known adverse effects.   She is hesitant to adjust her medications at this time given worsening of symptoms particularly with anxiety, depression and PTSD. Agreed to hold off on adjustment of regimen.   CSA signed 7/24   [facility]   Needs followup every 3 months         Orders:    DULoxetine (Cymbalta) 20 mg DR capsule; Take 1 capsule (20 mg) by mouth once daily. Do not crush or chew.    clonazePAM (KlonoPIN) 0.5 mg tablet; Take 1 tablet (0.5 mg) by mouth 3 times a day.

## 2024-09-23 ENCOUNTER — TELEPHONE (OUTPATIENT)
Dept: PRIMARY CARE | Facility: CLINIC | Age: 79
End: 2024-09-23

## 2024-09-26 ENCOUNTER — APPOINTMENT (OUTPATIENT)
Dept: PHARMACY | Facility: HOSPITAL | Age: 79
End: 2024-09-26
Payer: MEDICARE

## 2024-10-07 ENCOUNTER — APPOINTMENT (OUTPATIENT)
Dept: PHARMACY | Facility: HOSPITAL | Age: 79
End: 2024-10-07
Payer: MEDICARE

## 2024-10-07 DIAGNOSIS — G43.109 MIGRAINE WITH AURA AND WITHOUT STATUS MIGRAINOSUS, NOT INTRACTABLE: ICD-10-CM

## 2024-10-07 NOTE — PROGRESS NOTES
Clinical Pharmacy Appointment    Patient ID: Elida Yoon is a 79 y.o. female who presents for Migraine.    Pt is here for First appointment.     Referring Provider: Diana Jc DO  PCP: Diana Jc DO   Last visit with PCP: 9/18/2024   Next visit with PCP: Not scheduled     Elida Yoon has been approved for prior authorization for Nurtec ODT   Key: [id number]    Please contact clinical pharmacy with any further questions. Thank you.    Subjective      HPI    Patient is a 78 y.o. female who presents to a follow up clinical pharmacy visit for migraine management. She has 10-15 migraine days per month with nausea and aura. She previously tried sumatriptan for ~6 months with minimal relief. She currently takes OTC tylenol and ices her head for management with no relief.    Nurtec PA approved still >$1000/month.      Patient Assistance Screening (VAF)  Patient verbally reports monthly or yearly income which is less than 400% federal poverty level  Application for program has been submitted for the following medications:   Nurtec 75 mg ODT  Patient aware this process may take up to 2 weeks once income documents have been sent to the team.  If approved, medication must be filled through [facility] pharmacy and may be picked up or mailed to patient.   If approved, medication will be billed through insurance, and patient assistance team will pay the copay. This will result in a $0 copay for the patient.  Counseled patient on mechanism of action, side effects, contraindications, and what to do if the patient misses a dose. All patients questions were answered.     MIGRAINE  Does patient follow with neurology?: No  Current migraine medications include:  Sumatriptan 100 mg taking 1 tablet 1 time PRN migraine   Tylenol 325 mg q6h PRN migrain  Clarifications to above regimen: Stopped sumatriptan due to no relief   Adverse Effects: None  Assessment of Severity  Monthly Headache Days: 10-15 days per month    Previous MHDs: N/A  Acute Treatment Frequency: Tylenol daily    HURT Assessment  1. On how many days in the last month did you have a headache? 15 days  2. On how many days in the last three months did your headaches make it hard to work, study,  or carry out household work? Patient reported she only has herself so she gets things done but struggles. She also has fibromyalgia which makes things difficult. 3. On how many days in the last three months did your headaches spoil or prevent your family,  social or leisure activities? 10-15 days   4. On how many days in the last month did you take medication to relieve a headache? Tyneol daily -- does not help   5. When you take your headache medication, does one dose get rid of your headache and  keep it away? No  6. Do you feel in control of your headaches? No  7. Do you avoid or delay taking your headache medication because you do not like its sideeffects?   No -- stopped sumatriptan due to no relief     mTOQ-6 Assessment  1. Are you able to quickly return to your normal activities after taking your migraine  medication? 3  2. After taking your migraine medication, are you pain free within 2 hours for most attacks? 1  3. Does one dose of your migraine medication usually relieve your headache and keep it away  for at least 24 hours?  1  4. Is your migraine medication well tolerated? 4  5. Are you comfortable enough with your migraine medication to be able to plan your daily  activities? 3   6. After taking your migraine medication, do you feel in control of your migraines enough so  that you feel there will be no disruption to your daily activities? 1  Current mTOQ-6 Score: 12  Previous Score: N/A    Drug Interactions  No relevant drug interactions were noted.    Medication System Management  Patient's preferred pharmacy: [facility] [place], [place]  Adherence/Organization: Yes  Affordability/Accessibility: Nurtec PA approved still costly       Objective   Allergies  "  Allergen Reactions    Aspirin Anaphylaxis    Bee Venom Protein (Honey Bee) Anaphylaxis    Demerol [Meperidine] Anaphylaxis    Iodine Anaphylaxis    Penicillins Anaphylaxis    Sulfa (Sulfonamide Antibiotics) Anaphylaxis    Codeine Unknown     Social History     Social History Narrative    Not on file      Medication Review  Current Outpatient Medications   Medication Instructions    albuterol (Ventolin HFA) 90 mcg/actuation inhaler 2 puffs, inhalation, Every 4 hours PRN    clonazePAM (KLONOPIN) 0.5 mg, oral, 3 times daily    dicyclomine (BENTYL) 20 mg, oral, 4 times daily before meals and nightly    DULoxetine (CYMBALTA) 20 mg, oral, Daily, Do not crush or chew.    fluticasone (Flonase) 50 mcg/actuation nasal spray 1 spray, Each Nostril, Daily, Shake gently. Before first use, prime pump. After use, clean tip and replace cap.    fluticasone (Flonase) 50 mcg/actuation nasal spray nasal    linaCLOtide (LINZESS) 72 mcg, oral    ondansetron (Zofran) 4 mg tablet Take 1 tablet (4 mg) by mouth every 8 hours as needed for nausea    pantoprazole (PROTONIX) 40 mg, oral, Daily, Do not crush, chew, or split.    pregabalin (LYRICA) 25 mg, oral    rimegepant (NURTEC) 75 mg tablet,disintegrating Take 1 tablet by mouth once daily as needed at onset of headache      Vitals  BP Readings from Last 2 Encounters:   09/18/24 135/85   06/03/24 148/66     BMI Readings from Last 1 Encounters:   09/18/24 21.26 kg/m²      Labs  A1C  No results found for: \"HGBA1C\"  BMP  Lab Results   Component Value Date    CALCIUM 9.5 03/18/2024     03/18/2024    K 4.9 03/18/2024    CO2 25 03/18/2024     03/18/2024    BUN 13 03/18/2024    CREATININE 0.76 03/18/2024    EGFR 81 03/18/2024     LFTs  Lab Results   Component Value Date    ALT 5 (L) 03/18/2024    AST 17 03/18/2024    ALKPHOS 88 03/18/2024    BILITOT 0.6 03/18/2024     FLP  Lab Results   Component Value Date    TRIG 132 11/21/2023    CHOL 327 (H) 11/21/2023    LDLCALC 211 (H) 11/21/2023 " "   HDL 89.6 11/21/2023     Urine Microalbumin  No results found for: \"MICROALBCREA\"  Weight Management  Wt Readings from Last 3 Encounters:   09/18/24 54.4 kg (120 lb)   08/01/24 54 kg (119 lb)   06/05/24 54 kg (119 lb)      There is no height or weight on file to calculate BMI.     Education  Patient asked about using Botox to help with migraine relief. Discussed how it is a last line treatment for patients with >15 migraine days per month. Due to her being around that 15 day tulio it could be an option in the future to look into if she does not see success with Nurtec.   Counseled patient on MOA, expectations, side effects, duration of therapy, contraindications, administration, and monitoring parameters  Answered all patient questions and concerns    Assessment/Plan   Problem List Items Addressed This Visit       Migraine with aura    Relevant Orders    Follow Up In [facility] - Pharmacy   Consider starting Nurtec ODT 75 mg taking 1 tablet PRN onset of headache  Patient has not turned in tax documents to enroll in [employer]. She reported that she is not sure if she wants to go through the process of getting her enrolled in  [employer] and is planning on looking at different insurances to switch to for better coverage. Discussed how the only thing I need for the program to be active is the tax documents. Will follow up in a month for the patient to either drop tax documents off at PCP office and give her time to look into different insurance plans. She reported part of the reason she has not turned them in is due to having COVID and other family members being ill as well.   CONTINUE using Zofran 4 mg q8h PRN nausea with migraine  CONTINUE making healthy lifestyle choices   Patient has [facility] phone number for any additional questions or concerns prior to follow up visit      Follow up with Clinical Pharmacy Team 11/11/2024 at 9am     Time spent with pt: Total length of time 30 (minutes) of the encounter and more " than 50% was spent counseling the patient.    Continue all meds under the continuation of care with the referring provider and clinical pharmacy team.    Please reach out to the Clinical Pharmacy Team if there are any further questions.     Verbal consent to manage patient's drug therapy was obtained from patient. They were informed they may decline to participate or withdraw from participation in pharmacy services at any time.    Daina Johnson, PharmD  Clinical Pharmacy Specialist   [phone]

## 2024-10-15 ENCOUNTER — PATIENT OUTREACH (OUTPATIENT)
Dept: PRIMARY CARE | Facility: CLINIC | Age: 79
End: 2024-10-15
Payer: MEDICARE

## 2024-10-15 DIAGNOSIS — F33.9 DEPRESSION, RECURRENT (CMS-HCC): ICD-10-CM

## 2024-10-15 DIAGNOSIS — E78.00 PURE HYPERCHOLESTEROLEMIA: ICD-10-CM

## 2024-10-15 NOTE — PROGRESS NOTES
CCM outreach with pt identified by name and .    LOV: 24  NOV: not scheduled    Health Maintenance Due: vaccines    Goals for Depression:  Monitor mood and behavior changes.  Administer prescribed antidepressant medication.  Facilitate regular psychotherapy sessions.  Provide education on depression management.  Stick to a daily schedule  Call support group or person   Keep a healthy weight and eat healthy diet options    For Cholesterol:   Treatment goals include:  HIGHER IN FRUITS AND VEGGIES AND WHOLE GRAIN AND LOWER IN FATTY FOODS.     Reviewed warning s/s and advised to seek immediate medical attention if he develops discussed warning sx.    Spoke with pt who continues to struggle with depression and finds it difficult to live in [place].  Pt does have some friends and her daughter is supportive.  Pt talks with SW regarding her mood  and behavioral health  Pt was happy she is approved for Nurtec for her migraines and hopes she will get some relief.    Medications reviewed no questions or refills requested.  Pt verbalizes understanding and is in agreement with the POC.  Encouraged pt to reach out with any healthcare needs.

## 2024-11-11 ENCOUNTER — APPOINTMENT (OUTPATIENT)
Dept: PHARMACY | Facility: HOSPITAL | Age: 79
End: 2024-11-11
Payer: MEDICARE

## 2024-11-11 DIAGNOSIS — G43.109 MIGRAINE WITH AURA AND WITHOUT STATUS MIGRAINOSUS, NOT INTRACTABLE: ICD-10-CM

## 2024-11-11 NOTE — PROGRESS NOTES
Clinical Pharmacy Appointment    Patient ID: Elida Yoon is a 79 y.o. female who presents for Migraine.    Pt is here for First appointment.     Referring Provider: Diana Jc DO  PCP: Diana Jc DO   Last visit with PCP: 9/18/2024   Next visit with PCP: Not scheduled     Elida Yoon has been approved for prior authorization for Nurtec ODT   Key: [id number]    Please contact clinical pharmacy with any further questions. Thank you.    Subjective      HPI    Patient is a 78 y.o. female who presents to a follow up clinical pharmacy visit for migraine management. She has 10-15 migraine days per month with nausea and aura. She previously tried sumatriptan for ~6 months with minimal relief. She currently takes OTC tylenol and ices her head for management with no relief.    Nurtec PA approved still >$1000/month.      Patient Assistance Screening (VAF)  Patient verbally reports monthly or yearly income which is less than 400% federal poverty level  Application for program has been submitted for the following medications:   Nurtec 75 mg ODT  Patient aware this process may take up to 2 weeks once income documents have been sent to the team.  If approved, medication must be filled through [facility] the [facility] pharmacy and may be picked up or mailed to patient.   If approved, medication will be billed through insurance, and patient assistance team will pay the copay. This will result in a $0 copay for the patient.  Counseled patient on mechanism of action, side effects, contraindications, and what to do if the patient misses a dose. All patients questions were answered.     MIGRAINE  Does patient follow with neurology?: No  Current migraine medications include:  Sumatriptan 100 mg taking 1 tablet 1 time PRN migraine   Tylenol 325 mg q6h PRN migrain  Clarifications to above regimen: Stopped sumatriptan due to no relief   Adverse Effects: None  Assessment of Severity  Monthly Headache Days: 10-15 days per month    Previous MHDs: N/A  Acute Treatment Frequency: Tylenol daily    HURT Assessment  1. On how many days in the last month did you have a headache? 15 days  2. On how many days in the last three months did your headaches make it hard to work, study,  or carry out household work? Patient reported she only has herself so she gets things done but struggles. She also has fibromyalgia which makes things difficult. 3. On how many days in the last three months did your headaches spoil or prevent your family,  social or leisure activities? 10-15 days   4. On how many days in the last month did you take medication to relieve a headache? Tyneol daily -- does not help   5. When you take your headache medication, does one dose get rid of your headache and  keep it away? No  6. Do you feel in control of your headaches? No  7. Do you avoid or delay taking your headache medication because you do not like its sideeffects?   No -- stopped sumatriptan due to no relief     mTOQ-6 Assessment  1. Are you able to quickly return to your normal activities after taking your migraine  medication? 3  2. After taking your migraine medication, are you pain free within 2 hours for most attacks? 1  3. Does one dose of your migraine medication usually relieve your headache and keep it away  for at least 24 hours?  1  4. Is your migraine medication well tolerated? 4  5. Are you comfortable enough with your migraine medication to be able to plan your daily  activities? 3   6. After taking your migraine medication, do you feel in control of your migraines enough so  that you feel there will be no disruption to your daily activities? 1  Current mTOQ-6 Score: 12  Previous Score: N/A    Drug Interactions  No relevant drug interactions were noted.    Medication System Management  Patient's preferred pharmacy: [facility] [place], [place]  Adherence/Organization: Yes  Affordability/Accessibility: Nurtec PA approved still costly       Objective   Allergies  "  Allergen Reactions    Aspirin Anaphylaxis    Bee Venom Protein (Honey Bee) Anaphylaxis    Demerol [Meperidine] Anaphylaxis    Iodine Anaphylaxis    Penicillins Anaphylaxis    Sulfa (Sulfonamide Antibiotics) Anaphylaxis    Codeine Unknown     Social History     Social History Narrative    Not on file      Medication Review  Current Outpatient Medications   Medication Instructions    albuterol (Ventolin HFA) 90 mcg/actuation inhaler 2 puffs, inhalation, Every 4 hours PRN    clonazePAM (KLONOPIN) 0.5 mg, oral, 3 times daily    dicyclomine (BENTYL) 20 mg, oral, 4 times daily before meals and nightly    DULoxetine (CYMBALTA) 20 mg, oral, Daily, Do not crush or chew.    fluticasone (Flonase) 50 mcg/actuation nasal spray 1 spray, Each Nostril, Daily, Shake gently. Before first use, prime pump. After use, clean tip and replace cap.    fluticasone (Flonase) 50 mcg/actuation nasal spray nasal    linaCLOtide (LINZESS) 72 mcg, oral    ondansetron (Zofran) 4 mg tablet Take 1 tablet (4 mg) by mouth every 8 hours as needed for nausea    pantoprazole (PROTONIX) 40 mg, oral, Daily, Do not crush, chew, or split.    pregabalin (LYRICA) 25 mg, oral    rimegepant (NURTEC) 75 mg tablet,disintegrating Take 1 tablet by mouth once daily as needed at onset of headache      Vitals  BP Readings from Last 2 Encounters:   09/18/24 135/85   06/03/24 148/66     BMI Readings from Last 1 Encounters:   09/18/24 21.26 kg/m²      Labs  A1C  No results found for: \"HGBA1C\"  BMP  Lab Results   Component Value Date    CALCIUM 9.5 03/18/2024     03/18/2024    K 4.9 03/18/2024    CO2 25 03/18/2024     03/18/2024    BUN 13 03/18/2024    CREATININE 0.76 03/18/2024    EGFR 81 03/18/2024     LFTs  Lab Results   Component Value Date    ALT 5 (L) 03/18/2024    AST 17 03/18/2024    ALKPHOS 88 03/18/2024    BILITOT 0.6 03/18/2024     FLP  Lab Results   Component Value Date    TRIG 132 11/21/2023    CHOL 327 (H) 11/21/2023    LDLCALC 211 (H) 11/21/2023 " "   HDL 89.6 11/21/2023     Urine Microalbumin  No results found for: \"MICROALBCREA\"  Weight Management  Wt Readings from Last 3 Encounters:   09/18/24 54.4 kg (120 lb)   08/01/24 54 kg (119 lb)   06/05/24 54 kg (119 lb)      There is no height or weight on file to calculate BMI.     Education  Patient asked about using Botox to help with migraine relief. Discussed how it is a last line treatment for patients with >15 migraine days per month. Due to her being around that 15 day tulio it could be an option in the future to look into if she does not see success with Nurtec.   Counseled patient on MOA, expectations, side effects, duration of therapy, contraindications, administration, and monitoring parameters  Answered all patient questions and concerns    Assessment/Plan   Problem List Items Addressed This Visit       Migraine with aura   Patient expressed concerns with changes in Medicare/Social Security in the New Year. She stated she does not want to start a new medication at this time if changes happen and if that would cause her to need to stop it. Discussed that for the meantime to help with her symptoms [facility] can enroll her in  PAP and if changes occur in January we can tackle them with they occur. Re-emphasized that this program will get her the medication for a $0 copay. She stated she will consider send [facility] tax forms to start the medication.   Consider starting Nurtec ODT 75 mg taking 1 tablet PRN onset of headache  CONTINUE using Zofran 4 mg q8h PRN nausea with migraine  CONTINUE making healthy lifestyle choices   Patient has [facility] phone number for any additional questions or concerns prior to follow up visit      Follow up with Clinical Pharmacy Team as needed by patient      Time spent with pt: Total length of time 30 (minutes) of the encounter and more than 50% was spent counseling the patient.    Continue all meds under the continuation of care with the referring provider and clinical pharmacy " team.    Please reach out to the Clinical Pharmacy Team if there are any further questions.     Verbal consent to manage patient's drug therapy was obtained from patient. They were informed they may decline to participate or withdraw from participation in pharmacy services at any time.    Daina Johnson, PharmD  Clinical Pharmacy Specialist   [phone]

## 2024-11-13 ENCOUNTER — DOCUMENTATION (OUTPATIENT)
Dept: PRIMARY CARE | Facility: CLINIC | Age: 79
End: 2024-11-13
Payer: MEDICARE

## 2024-11-15 ENCOUNTER — PATIENT OUTREACH (OUTPATIENT)
Dept: PRIMARY CARE | Facility: CLINIC | Age: 79
End: 2024-11-15
Payer: MEDICARE

## 2024-11-15 DIAGNOSIS — F33.9 DEPRESSION, RECURRENT (CMS-HCC): ICD-10-CM

## 2024-11-15 DIAGNOSIS — G43.109 MIGRAINE WITH AURA AND WITHOUT STATUS MIGRAINOSUS, NOT INTRACTABLE: ICD-10-CM

## 2024-11-15 NOTE — PROGRESS NOTES
Spoke with patient for monthly CCM outreach. Chart reviewed. Introduced self.  'I'm doing ok.' Recently had friends visiting from out of town, that was enjoyable.   Had recent car issues, that has been taken care of.  Currently laying on the couch but usually out and about.  Going to look into senior living. Not happy with current landlord. Daughter and son live close by.   Has moved 5 x since husbands passing.  Has had HA and used Tylenol and recommended medication (Magnesium and riboflavin) with some relief. Has not tried Nurtec.  No medication refills needed at this time.  Encouraged to call with questions, concerns or needs.

## 2024-12-16 ENCOUNTER — PATIENT OUTREACH (OUTPATIENT)
Dept: PRIMARY CARE | Facility: CLINIC | Age: 79
End: 2024-12-16
Payer: MEDICARE

## 2024-12-16 DIAGNOSIS — G43.109 MIGRAINE WITH AURA AND WITHOUT STATUS MIGRAINOSUS, NOT INTRACTABLE: ICD-10-CM

## 2024-12-16 DIAGNOSIS — K51.919 ULCERATIVE COLITIS WITH COMPLICATION, UNSPECIFIED LOCATION (MULTI): ICD-10-CM

## 2024-12-16 NOTE — PROGRESS NOTES
Spoke with patient for monthly CCM outreach. Chart reviewed. Introduced self.  Doing well.  Appetite remains decreased.   Has HA but has not gotten Nurtec filled.  Concerned regarding Mohls procedure next month due to insurance coverage.  Got tires changed for winter.  Wedding anniversary this time of year, would have been 61 years.  Requesting refill of lomotil. Takes bentyl for cramping.  No other medication refills needed at this time.  Confirmed upcoming appointment.  Encouraged to call with questions, concerns or needs.

## 2024-12-17 ENCOUNTER — APPOINTMENT (OUTPATIENT)
Dept: PRIMARY CARE | Facility: CLINIC | Age: 79
End: 2024-12-17
Payer: MEDICARE

## 2025-01-12 DIAGNOSIS — F41.9 ANXIETY: ICD-10-CM

## 2025-01-13 RX ORDER — DULOXETIN HYDROCHLORIDE 20 MG/1
20 CAPSULE, DELAYED RELEASE ORAL DAILY
Qty: 30 CAPSULE | Refills: 0 | Status: SHIPPED | OUTPATIENT
Start: 2025-01-13 | End: 2025-02-12

## 2025-01-16 ENCOUNTER — PATIENT OUTREACH (OUTPATIENT)
Dept: PRIMARY CARE | Facility: CLINIC | Age: 80
End: 2025-01-16
Payer: MEDICARE

## 2025-01-16 DIAGNOSIS — M81.0 OSTEOPOROSIS, UNSPECIFIED OSTEOPOROSIS TYPE, UNSPECIFIED PATHOLOGICAL FRACTURE PRESENCE: ICD-10-CM

## 2025-01-16 DIAGNOSIS — G43.109 MIGRAINE WITH AURA AND WITHOUT STATUS MIGRAINOSUS, NOT INTRACTABLE: ICD-10-CM

## 2025-01-16 NOTE — PROGRESS NOTES
Spoke with patient for monthly CCM outreach. Chart reviewed. Introduced self.  Doing ok.  Dog passed away yesterday.  Has occasional HA.  Will continue to look into senior living facilities.  Has Mohl's procedure tomorrow. Son driving.  Encouraged to call with questions, concerns or needs.

## 2025-01-17 ENCOUNTER — APPOINTMENT (OUTPATIENT)
Dept: DERMATOLOGY | Facility: CLINIC | Age: 80
End: 2025-01-17
Payer: MEDICARE

## 2025-01-17 VITALS — SYSTOLIC BLOOD PRESSURE: 135 MMHG | HEART RATE: 71 BPM | DIASTOLIC BLOOD PRESSURE: 73 MMHG

## 2025-01-17 DIAGNOSIS — D04.39 SQUAMOUS CELL CARCINOMA IN SITU (SCCIS) OF SKIN OF FOREHEAD: ICD-10-CM

## 2025-01-17 NOTE — PROGRESS NOTES
Mohs Surgery Operative Note    Date of Surgery:  1/17/2025  Surgeon:  Lanie Prieto MD  Office Location: [address]  [facility] [address]  Dept: [phone]  Dept Fax: [phone]  Referring Provider: Cinthia Prince MD    Assessment/Plan   Pre-procedure:   Obtained informed consent: written from patient  The surgical site was identified and confirmed with the patient.     Intra-operative:   Audible time out called at : 09:20 AM 01/17/25  by: Felicity Jauregui RN   Verified patient name, birthdate, site, specimen bottle label & requisition.    The planned procedure(s) was again reviewed with the patient. The risks of bleeding, infection, nerve damage and scarring were reviewed. Written authorization was obtained. The patient identity, surgical site, and planned procedure(s) were verified. The provider acted as both surgeon and pathologist.     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  Left Inferior Medial Forehead  Mohs surgery - Left Inferior Medial Forehead  Consent obtained: written    Universal Protocol:  Procedure explained and questions answered to patient or proxy's satisfaction: Yes    Test results available and properly labeled: Yes    Pathology report reviewed: Yes    External notes reviewed: Yes    Photo or diagram used for site identification: Yes    Site/side marked: Yes    Slide independently reviewed by Mohs surgeon: Yes    Immediately prior to procedure a time out was called: Yes    Patient identity confirmed: verbally with patient  Preparation: Patient was prepped and draped in usual sterile fashion      Anticoagulation:  Is the patient taking prescription anticoagulant and/or aspirin prescribed/recommended by a physician? No    Was the anticoagulation regimen changed prior to Mohs? No      Anesthesia:  Anesthesia method: local infiltration  Local anesthetic: 1.5% Lidocaine with Epi.    Pain Control:  Acetaminophen 1000mg PO  given at 10:50 AM    Procedure Details:  Case ID Number: [id number]  Biopsy accession number: [id number]  Date of biopsy: 9/5/2024  Pre-Op diagnosis: squamous cell carcinoma  SCC subtype: in situ  Surgical site (from skin exam): Left Inferior Medial Forehead  Pre-operative length (cm): 1.1  Pre-operative width (cm): 1  Indications for Mohs surgery: anatomic location where tissue conservation is critical  Previously treated? No      Micrographic Surgery Details:  Post-operative length (cm): 1.9  Post-operative width (cm): 1.8  Number of Mohs stages: 2    Stage 1  Comments: The patient was brought into the operating room and placed in the procedure chair in the appropriate position.  The area positive by previous biopsy was identified and confirmed with the patient. The area of clinically obvious tumor was debulked using a curette and/or scalpel as needed. An incision was made following the Mohs approach through the skin. The specimen was taken to the lab, divided into 2 piece(s) and appropriately chromacoded and processed.  Tumor was seen on the lateral margins as indicated on the on the Mohs map.  Squamous cell carcinoma in situ. Histologic examination revealed enlarged, atypical keratinocytes with large nuclear to cytoplasmic ratio extending throughout the full thickness of an epidermis.   Tumor features identified on Mohs section: Squamous Cell Carcinoma in Situ   Depth of invasion: Epidermis    Stage 2  Comments: The area of positivity as noted on the Mohs map in the previous stage was identified and removed using the Mohs technique. The specimen was taken to the lab and appropriately chromacoded and processed in 1 piece(s).  Tumor features identified on Mohs section: no tumor identified  Depth of defect: subcutaneous fat    Patient tolerance of procedure: tolerated well, no immediate complications    Reconstruction:  Was the defect reconstructed? Yes    Was reconstruction performed by the same Mohs surgeon?  Yes    Setting of reconstruction: outpatient office  When was reconstruction performed? same day  Type of reconstruction: linear  Linear reconstruction: complex  Length of linear repair (cm): 3.4  Subcutaneous Layers (Deep Stitches)   Suture size:  5-0  Suture type:  Vicryl  Stitches:  Buried vertical mattress  Fine/surface layer approximation (top stitches)   Epidermal/Superficial suture size:  5-0  Epidermal/Superficial suture type:  Fast-absorbing gut  Stitches: simple running    Hemostasis achieved with: electrodesiccation  Outcome: patient tolerated procedure well with no complications    Post-procedure details: sterile dressing applied and wound care instructions given    Dressing type: pressure dressing      Complex Linear Repair - Wide Undermining:  Given the location and size of the defect, it was determined that a complex layered linear closure was required to restore normal anatomy and function. The repair was considered complex because extensive undermining was required and performed. The amount of undermining performed was greater than the maximum width of the defect as measured perpendicular to the closure line along at least one entire edge of the defect. Standing cutaneous cones were removed using Burow's triangles. The wound edges were brought into close approximation with buried vertical mattress sutures. The remainder of the wound was then closed with epidermal top sutures.    The final repair measured 3.4 cm      Wound care was discussed, and the patient was given written post-operative wound care instructions.      The patient will follow up with Lanie Prieto MD as needed for any post operative problems or concerns, and will follow up with their primary dermatologist as scheduled.

## 2025-01-17 NOTE — LETTER
January 21, 2025     Cinthia Prince MD    Patient: Elida Yoon   YOB: 1945   Date of Visit: 1/17/2025       Dear Dr. Cinthia Prince MD:    Thank you for referring Elida Yoon to me for evaluation. Below are my notes for this consultation.  If you have questions, please do not hesitate to call me. I look forward to following your patient along with you.       Sincerely,     Lanie Prieto MD      CC: No Recipients  ______________________________________________________________________________________    Mohs Surgery Operative Note    Date of Surgery:  1/17/2025  Surgeon:  Lanie Prieto MD  Office Location: [address] [address]  Dept: [phone]  Dept Fax: [phone]  Referring Provider: Cinthia Prince MD    Assessment/Plan  Pre-procedure:   Obtained informed consent: written from patient  The surgical site was identified and confirmed with the patient.     Intra-operative:   Audible time out called at : 09:20 AM 01/17/25  by: Felicity Jauregui RN   Verified patient name, birthdate, site, specimen bottle label & requisition.    The planned procedure(s) was again reviewed with the patient. The risks of bleeding, infection, nerve damage and scarring were reviewed. Written authorization was obtained. The patient identity, surgical site, and planned procedure(s) were verified. The provider acted as both surgeon and pathologist.     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  Left Inferior Medial Forehead  Mohs surgery - Left Inferior Medial Forehead  Consent obtained: written    Universal Protocol:  Procedure explained and questions answered to patient or proxy's satisfaction: Yes    Test results available and properly labeled: Yes    Pathology report reviewed: Yes    External notes reviewed: Yes    Photo or diagram used for site identification: Yes    Site/side marked: Yes    Slide independently reviewed by Mohs  surgeon: Yes    Immediately prior to procedure a time out was called: Yes    Patient identity confirmed: verbally with patient  Preparation: Patient was prepped and draped in usual sterile fashion      Anticoagulation:  Is the patient taking prescription anticoagulant and/or aspirin prescribed/recommended by a physician? No    Was the anticoagulation regimen changed prior to Mohs? No      Anesthesia:  Anesthesia method: local infiltration  Local anesthetic: 1.5% Lidocaine with Epi.    Pain Control:  Acetaminophen 1000mg PO given at 10:50 AM    Procedure Details:  Case ID Number: [id number]  Biopsy accession number: [id number]  Date of biopsy: 9/5/2024  Pre-Op diagnosis: squamous cell carcinoma  SCC subtype: in situ  Surgical site (from skin exam): Left Inferior Medial Forehead  Pre-operative length (cm): 1.1  Pre-operative width (cm): 1  Indications for Mohs surgery: anatomic location where tissue conservation is critical  Previously treated? No      Micrographic Surgery Details:  Post-operative length (cm): 1.9  Post-operative width (cm): 1.8  Number of Mohs stages: 2    Stage 1  Comments: The patient was brought into the operating room and placed in the procedure chair in the appropriate position.  The area positive by previous biopsy was identified and confirmed with the patient. The area of clinically obvious tumor was debulked using a curette and/or scalpel as needed. An incision was made following the Mohs approach through the skin. The specimen was taken to the lab, divided into 2 piece(s) and appropriately chromacoded and processed.  Tumor was seen on the lateral margins as indicated on the on the Mohs map.  Squamous cell carcinoma in situ. Histologic examination revealed enlarged, atypical keratinocytes with large nuclear to cytoplasmic ratio extending throughout the full thickness of an epidermis.   Tumor features identified on Mohs section: Squamous Cell Carcinoma in Situ   Depth of invasion:  Epidermis    Stage 2  Comments: The area of positivity as noted on the Mohs map in the previous stage was identified and removed using the Mohs technique. The specimen was taken to the lab and appropriately chromacoded and processed in 1 piece(s).  Tumor features identified on Mohs section: no tumor identified  Depth of defect: subcutaneous fat    Patient tolerance of procedure: tolerated well, no immediate complications    Reconstruction:  Was the defect reconstructed? Yes    Was reconstruction performed by the same Mohs surgeon? Yes    Setting of reconstruction: outpatient office  When was reconstruction performed? same day  Type of reconstruction: linear  Linear reconstruction: complex  Length of linear repair (cm): 3.4  Subcutaneous Layers (Deep Stitches)   Suture size:  5-0  Suture type:  Vicryl  Stitches:  Buried vertical mattress  Fine/surface layer approximation (top stitches)   Epidermal/Superficial suture size:  5-0  Epidermal/Superficial suture type:  Fast-absorbing gut  Stitches: simple running    Hemostasis achieved with: electrodesiccation  Outcome: patient tolerated procedure well with no complications    Post-procedure details: sterile dressing applied and wound care instructions given    Dressing type: pressure dressing      Complex Linear Repair - Wide Undermining:  Given the location and size of the defect, it was determined that a complex layered linear closure was required to restore normal anatomy and function. The repair was considered complex because extensive undermining was required and performed. The amount of undermining performed was greater than the maximum width of the defect as measured perpendicular to the closure line along at least one entire edge of the defect. Standing cutaneous cones were removed using Burow's triangles. The wound edges were brought into close approximation with buried vertical mattress sutures. The remainder of the wound was then closed with epidermal top  sutures.    The final repair measured 3.4 cm      Wound care was discussed, and the patient was given written post-operative wound care instructions.      The patient will follow up with Lanie Prieto MD as needed for any post operative problems or concerns, and will follow up with their primary dermatologist as scheduled.        Office Visit Note  Date: 1/17/2025  Surgeon:  Lanie Prieto MD  Office Location: [address] [address]  Dept: [phone]  Dept Fax: [phone]  Referring Provider: Pavan Monroe MD  [address] [place] [address] Dr  [facility] [place] [place] [address]    Subjective  Elida Yoon is a 79 y.o. female who presents for the following: MOHS Surgery    According to the patient, the lesion has been present for approximately greater than 1 year at the time of diagnosis.  The lesion is not causing symptoms.  The lesion has not been treated previously.    The patient does not have a pacemaker / defibrillator.  The patient does not have a heart valve / joint replacement.    The patient is not on blood thinners.  The patient does not have a history of hepatitis B or C.  The patient does not have a history of HIV.  The patient does have a history of immunosuppression (e.g. organ transplantation, malignancy, medications)    Review of Systems:  No other skin or systemic complaints other than what is documented elsewhere in the note.    MEDICAL HISTORY: clinically relevant history including significant past medical history, medications and allergies was reviewed and documented in Epic.    Objective  Well appearing patient in no apparent distress; mood and affect are within normal limits.  Vital signs: See record.  Noted on the Left Inferior Medial Forehead    Left Inferior Medial Forehead  Is a 1.1 x 1.0 cm scar    The patient confirmed the identified site.    Discussion:  The nature of the diagnosis was explained. The  lesion is a skin cancer.  It has a risk of local growth and distant spread. The condition is associated with sun exposure.  Warning signs of non-melanoma skin cancer discussed. Patient was instructed to perform monthly self skin examination.  We recommended that the patient have regular full skin exams given an increased risk of subsequent skin cancers. The patient was instructed to use sun protective behaviors including use of broad spectrum sunscreens and sun protective clothing to reduce risk of skin cancers.      Risks, benefits, side effects of Mohs surgery were discussed with patient and the patient voiced understanding.  It was explained that even though the cure rate of Mohs is very high it is not 100%. Risks of surgery including but not limited to bleeding, infection, numbness, nerve damage, and scar were reviewed.  Discussion included wound care requirements, activity restrictions, likely scar outcome and time to heal.     After Mohs surgery, the defect may need to be repaired surgically and the scar may be longer than the original lesion.  Reconstruction options, risks, and benefits were reviewed including second intention healing, linear repair (4-1 ratio was explained), local flaps, skin grafts, cartilage grafts and interpolation flaps (the need for multiple surgeries was explained). Possible outcomes were reviewed including likely scar appearance, failure of flap survival, infection, bleeding and the need for revision surgery.     The pathology was reviewed, the photograph was reviewed, and the referring physician's note was reviewed.    Patient elected for Mohs surgery.

## 2025-01-17 NOTE — PROGRESS NOTES
Office Visit Note  Date: 1/17/2025  Surgeon:  Lanie Prieto MD  Office Location: [address]  [facility] [address]  Dept: [phone]  Dept Fax: [phone]  Referring Provider: Pavan Monroe MD  [address] [place] [address]   [facility] [address]  [place] [address]    Subjective   Elida Yoon is a 79 y.o. female who presents for the following: MOHS Surgery    According to the patient, the lesion has been present for approximately greater than 1 year at the time of diagnosis.  The lesion is not causing symptoms.  The lesion has not been treated previously.    The patient does not have a pacemaker / defibrillator.  The patient does not have a heart valve / joint replacement.    The patient is not on blood thinners.  The patient does not have a history of hepatitis B or C.  The patient does not have a history of HIV.  The patient does have a history of immunosuppression (e.g. organ transplantation, malignancy, medications)    Review of Systems:  No other skin or systemic complaints other than what is documented elsewhere in the note.    MEDICAL HISTORY: clinically relevant history including significant past medical history, medications and allergies was reviewed and documented in Epic.    Objective   Well appearing patient in no apparent distress; mood and affect are within normal limits.  Vital signs: See record.  Noted on the Left Inferior Medial Forehead    Left Inferior Medial Forehead  Is a 1.1 x 1.0 cm scar    The patient confirmed the identified site.    Discussion:  The nature of the diagnosis was explained. The lesion is a skin cancer.  It has a risk of local growth and distant spread. The condition is associated with sun exposure.  Warning signs of non-melanoma skin cancer discussed. Patient was instructed to perform monthly self skin examination.  We recommended that the patient have regular full skin exams given an increased risk of  subsequent skin cancers. The patient was instructed to use sun protective behaviors including use of broad spectrum sunscreens and sun protective clothing to reduce risk of skin cancers.      Risks, benefits, side effects of Mohs surgery were discussed with patient and the patient voiced understanding.  It was explained that even though the cure rate of Mohs is very high it is not 100%. Risks of surgery including but not limited to bleeding, infection, numbness, nerve damage, and scar were reviewed.  Discussion included wound care requirements, activity restrictions, likely scar outcome and time to heal.     After Mohs surgery, the defect may need to be repaired surgically and the scar may be longer than the original lesion.  Reconstruction options, risks, and benefits were reviewed including second intention healing, linear repair (4-1 ratio was explained), local flaps, skin grafts, cartilage grafts and interpolation flaps (the need for multiple surgeries was explained). Possible outcomes were reviewed including likely scar appearance, failure of flap survival, infection, bleeding and the need for revision surgery.     The pathology was reviewed, the photograph was reviewed, and the referring physician's note was reviewed.    Patient elected for Mohs surgery.

## 2025-02-17 ENCOUNTER — PATIENT OUTREACH (OUTPATIENT)
Dept: PRIMARY CARE | Facility: CLINIC | Age: 80
End: 2025-02-17
Payer: MEDICARE

## 2025-02-17 DIAGNOSIS — G43.109 MIGRAINE WITH AURA AND WITHOUT STATUS MIGRAINOSUS, NOT INTRACTABLE: ICD-10-CM

## 2025-02-17 DIAGNOSIS — M81.0 OSTEOPOROSIS, UNSPECIFIED OSTEOPOROSIS TYPE, UNSPECIFIED PATHOLOGICAL FRACTURE PRESENCE: ICD-10-CM
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
## 2025-02-26 ENCOUNTER — APPOINTMENT (OUTPATIENT)
Dept: PRIMARY CARE | Facility: CLINIC | Age: 80
End: 2025-02-26
Payer: MEDICARE

## 2025-02-27 DIAGNOSIS — F41.9 ANXIETY: ICD-10-CM

## 2025-02-27 RX ORDER — CLONAZEPAM 0.5 MG/1
0.5 TABLET ORAL 3 TIMES DAILY
Qty: 90 TABLET | Refills: 0 | Status: SHIPPED | OUTPATIENT
Start: 2025-02-27 | End: 2025-05-28

## 2025-03-03 ENCOUNTER — TELEPHONE (OUTPATIENT)
Dept: PRIMARY CARE | Facility: CLINIC | Age: 80
End: 2025-03-03

## 2025-03-04 ENCOUNTER — APPOINTMENT (OUTPATIENT)
Dept: PRIMARY CARE | Facility: CLINIC | Age: 80
End: 2025-03-04
Payer: MEDICARE

## 2025-03-04 DIAGNOSIS — F10.21 HISTORY OF ALCOHOLISM (MULTI): ICD-10-CM

## 2025-03-04 DIAGNOSIS — K57.92 DIVERTICULITIS: ICD-10-CM

## 2025-03-04 DIAGNOSIS — F41.9 ANXIETY: Primary | ICD-10-CM

## 2025-03-04 DIAGNOSIS — F33.1 MODERATE EPISODE OF RECURRENT MAJOR DEPRESSIVE DISORDER: ICD-10-CM

## 2025-03-04 DIAGNOSIS — K51.819 OTHER ULCERATIVE COLITIS WITH COMPLICATION: ICD-10-CM

## 2025-03-04 RX ORDER — DICYCLOMINE HYDROCHLORIDE 20 MG/1
20 TABLET ORAL
Qty: 120 TABLET | Refills: 0 | Status: SHIPPED | OUTPATIENT
Start: 2025-03-04 | End: 2026-03-04
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
## 2025-03-04 NOTE — ASSESSMENT & PLAN NOTE
With suspected flare of symptoms, given similar presentation of UC flare in the past, though cannot rule out alternate etiology in this telephone encounter.   Pt requesting lomotil, which I feel may be harmful if diarrhea is of an infectious etiology   Given frequency of bowel movements, possible blood in stool,  in high risk patient with reduced PO intake I strongly recommended for the patient to present to the emergency room for further evaluation.   Pt declines, despite expressing understanding for stated risks of not presenting including death. States she wishes trial of 24 hours of supportive measures prior to evaluation. If not improving, she is agreeable to presenting to to the ED.   Refer to GI.   Orders:    Referral to Gastroenterology; Future

## 2025-03-04 NOTE — ASSESSMENT & PLAN NOTE
Currently on clonazepam TID with no mood lability and has chronically been stable on this medication without known adverse effects.   She has not been seen in over 6 months which is in violation of the terms of our CSA, was to have been seen today in person but due to fecal incontinence she was not comfortable to come in, appears to have been misunderstanding with scheduling.   Will reach out to risk management for guidance on this situation, have her come in for in person evaluation week.   Understands the need to be seen every 3 months in person for continued prescribing of this medication.   CSA signed 7/24   Utox due

## 2025-03-04 NOTE — ASSESSMENT & PLAN NOTE
Currently on clonazepam TID with no mood lability and has chronically been stable on this medication without known adverse effects.   She has not been seen in over 6 months   CSA signed 7/24   Utox due  PDMP reviewed.

## 2025-03-04 NOTE — ASSESSMENT & PLAN NOTE
Orders:    dicyclomine (Bentyl) 20 mg tablet; Take 1 tablet (20 mg) by mouth 4 times a day before meals.

## 2025-03-04 NOTE — ASSESSMENT & PLAN NOTE
With worsening of symptoms in the setting of loss of her dog, denies SI/HI.   Is amenable to now reaching out to behavioral health for further management

## 2025-03-12 ENCOUNTER — APPOINTMENT (OUTPATIENT)
Dept: PRIMARY CARE | Facility: CLINIC | Age: 80
End: 2025-03-12
Payer: MEDICARE

## 2025-03-12 VITALS
HEART RATE: 68 BPM | WEIGHT: 121.6 LBS | OXYGEN SATURATION: 98 % | BODY MASS INDEX: 21.54 KG/M2 | SYSTOLIC BLOOD PRESSURE: 117 MMHG | DIASTOLIC BLOOD PRESSURE: 71 MMHG

## 2025-03-12 DIAGNOSIS — Z51.81 ENCOUNTER FOR THERAPEUTIC DRUG LEVEL MONITORING: ICD-10-CM

## 2025-03-12 DIAGNOSIS — E78.5 HYPERLIPIDEMIA, UNSPECIFIED HYPERLIPIDEMIA TYPE: ICD-10-CM

## 2025-03-12 DIAGNOSIS — F33.9 DEPRESSION, RECURRENT (CMS-HCC): ICD-10-CM

## 2025-03-12 DIAGNOSIS — R06.2 WHEEZING: ICD-10-CM

## 2025-03-12 DIAGNOSIS — F33.1 MODERATE EPISODE OF RECURRENT MAJOR DEPRESSIVE DISORDER: ICD-10-CM

## 2025-03-12 DIAGNOSIS — M81.0 OSTEOPOROSIS, UNSPECIFIED OSTEOPOROSIS TYPE, UNSPECIFIED PATHOLOGICAL FRACTURE PRESENCE: ICD-10-CM

## 2025-03-12 DIAGNOSIS — K51.919 ULCERATIVE COLITIS WITH COMPLICATION, UNSPECIFIED LOCATION (MULTI): ICD-10-CM

## 2025-03-12 DIAGNOSIS — Z00.00 ENCOUNTER FOR PREVENTATIVE ADULT HEALTH CARE EXAMINATION: Primary | ICD-10-CM

## 2025-03-12 DIAGNOSIS — G43.109 MIGRAINE WITH AURA AND WITHOUT STATUS MIGRAINOSUS, NOT INTRACTABLE: ICD-10-CM

## 2025-03-12 DIAGNOSIS — M79.7 FIBROMYALGIA: ICD-10-CM

## 2025-03-12 DIAGNOSIS — F41.9 ANXIETY: ICD-10-CM

## 2025-03-12 DIAGNOSIS — Z85.820 H/O MALIGNANT MELANOMA: ICD-10-CM

## 2025-03-12 PROCEDURE — 1159F MED LIST DOCD IN RCRD: CPT | Performed by: INTERNAL MEDICINE

## 2025-03-12 PROCEDURE — G2211 COMPLEX E/M VISIT ADD ON: HCPCS | Performed by: INTERNAL MEDICINE

## 2025-03-12 PROCEDURE — 99215 OFFICE O/P EST HI 40 MIN: CPT | Performed by: INTERNAL MEDICINE

## 2025-03-12 PROCEDURE — 1160F RVW MEDS BY RX/DR IN RCRD: CPT | Performed by: INTERNAL MEDICINE

## 2025-03-12 PROCEDURE — 1036F TOBACCO NON-USER: CPT | Performed by: INTERNAL MEDICINE

## 2025-03-12 PROCEDURE — 1125F AMNT PAIN NOTED PAIN PRSNT: CPT | Performed by: INTERNAL MEDICINE

## 2025-03-12 RX ORDER — ONDANSETRON 4 MG/1
TABLET, FILM COATED ORAL
Qty: 20 TABLET | Refills: 0 | Status: SHIPPED | OUTPATIENT
Start: 2025-03-12

## 2025-03-12 RX ORDER — ALBUTEROL SULFATE 90 UG/1
2 INHALANT RESPIRATORY (INHALATION) EVERY 4 HOURS PRN
Qty: 8 G | Refills: 1 | Status: SHIPPED | OUTPATIENT
Start: 2025-03-12 | End: 2026-03-12

## 2025-03-12 ASSESSMENT — PAIN SCALES - GENERAL: PAINLEVEL_OUTOF10: 7

## 2025-03-12 NOTE — PATIENT INSTRUCTIONS
Elida,   Labs including bloodwork and/or urine is ordered today. The lab can be found on this floor (2nd floor) next to the pharmacy across from the elevators.    Schedule with dentist.   Go to [facility] to find a list of therapists   Schedule eye exam.     Followup 3 months for annual wellness visit

## 2025-03-12 NOTE — ASSESSMENT & PLAN NOTE
Overall stable coping well denies SI/HI  Referral offered for psychology referral at outside institution  Orders:    Referral to Psychology; Future

## 2025-03-12 NOTE — ASSESSMENT & PLAN NOTE
Interval significant improvement without any treatment, was approved for [facility] but ultimately declined.  Orders:    ondansetron (Zofran) 4 mg tablet; Take 1 tablet (4 mg) by mouth every 8 hours as needed for nausea

## 2025-03-12 NOTE — ASSESSMENT & PLAN NOTE
Acute diarrheal episode, unclear if flare but warrants followup with gastroenterology   Symptoms have since significantly improved, no e/o toxicity, exam unrevealing.  Will have further bloodwork performed with upcoming visit with GI.   Orders:    CBC and Auto Differential; Future    Comprehensive Metabolic Panel; Future    Lipid Panel; Future
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
## 2025-03-13 ENCOUNTER — APPOINTMENT (OUTPATIENT)
Dept: GASTROENTEROLOGY | Facility: CLINIC | Age: 80
End: 2025-03-13
Payer: MEDICARE

## 2025-03-14 ENCOUNTER — OFFICE VISIT (OUTPATIENT)
Dept: GASTROENTEROLOGY | Facility: CLINIC | Age: 80
End: 2025-03-14
Payer: MEDICARE

## 2025-03-14 ENCOUNTER — APPOINTMENT (OUTPATIENT)
Dept: GASTROENTEROLOGY | Facility: CLINIC | Age: 80
End: 2025-03-14
Payer: MEDICARE

## 2025-03-14 DIAGNOSIS — K21.9 GASTROESOPHAGEAL REFLUX DISEASE, UNSPECIFIED WHETHER ESOPHAGITIS PRESENT: ICD-10-CM

## 2025-03-14 DIAGNOSIS — K58.2 IRRITABLE BOWEL SYNDROME WITH BOTH CONSTIPATION AND DIARRHEA: ICD-10-CM

## 2025-03-14 DIAGNOSIS — K64.9 BLEEDING HEMORRHOIDS: Primary | ICD-10-CM

## 2025-03-14 DIAGNOSIS — R10.12 LUQ PAIN: ICD-10-CM

## 2025-03-14 DIAGNOSIS — K62.5 RECTAL BLEEDING: ICD-10-CM

## 2025-03-14 PROCEDURE — 99215 OFFICE O/P EST HI 40 MIN: CPT

## 2025-03-14 RX ORDER — SYRING-NEEDL,DISP,INSUL,0.3 ML 29 G X1/2"
296 SYRINGE, EMPTY DISPOSABLE MISCELLANEOUS ONCE
Qty: 296 ML | Refills: 0 | Status: SHIPPED | OUTPATIENT
Start: 2025-03-14 | End: 2025-03-14

## 2025-03-14 RX ORDER — LUBIPROSTONE 8 UG/1
8 CAPSULE ORAL
Qty: 60 CAPSULE | Refills: 11 | Status: SHIPPED | OUTPATIENT
Start: 2025-03-14 | End: 2026-03-14

## 2025-03-14 RX ORDER — PANTOPRAZOLE SODIUM 40 MG/1
40 TABLET, DELAYED RELEASE ORAL DAILY
Qty: 30 TABLET | Refills: 11 | Status: SHIPPED | OUTPATIENT
Start: 2025-03-14 | End: 2026-03-14

## 2025-03-14 RX ORDER — POLYETHYLENE GLYCOL 3350, SODIUM SULFATE ANHYDROUS, SODIUM BICARBONATE, SODIUM CHLORIDE, POTASSIUM CHLORIDE 236; 22.74; 6.74; 5.86; 2.97 G/4L; G/4L; G/4L; G/4L; G/4L
4000 POWDER, FOR SOLUTION ORAL ONCE
Qty: 4000 ML | Refills: 0 | Status: SHIPPED | OUTPATIENT
Start: 2025-03-14 | End: 2025-03-14

## 2025-03-14 ASSESSMENT — ENCOUNTER SYMPTOMS
NAUSEA: 0
NERVOUS/ANXIOUS: 1
TROUBLE SWALLOWING: 0
ABDOMINAL PAIN: 1
CONSTIPATION: 1
APPETITE CHANGE: 0
BLOOD IN STOOL: 0
COUGH: 0
SHORTNESS OF BREATH: 0
FEVER: 0
RECTAL PAIN: 0
ANAL BLEEDING: 1
DIARRHEA: 1
ABDOMINAL DISTENTION: 1
VOMITING: 0
CHILLS: 0
FATIGUE: 0

## 2025-03-14 NOTE — PROGRESS NOTES
Subjective     History of Present Illness:   Elida Yoon is a 79 y.o. female with PMHx of chronic constipation/IBS, esophageal reflux, MV disorder, UC, alcoholism who presents to GI clinic for follow-up    Last seen 6/2024 for GERD, 40 mg Protonix daily, consider EGD.  Diverticulitis unresolved, 14-day course of Flagyl plus Levaquin, once resolved will schedule colonoscopy, as needed Bentyl    -5/2024 for diverticulitis.  Cipro and Flagyl prescribed, advance diet as tolerated and start daily fiber with resolution.  LUQ pain-reassess during next visit due to possible correlation with diverticulitis  3/2024 CT without contrast for LLQ pain suggested mild sigmoid diverticulitis.  Patient was treated with antibiotics.    Today, patient has LUQ pain.  She is either is constipated and has diarrhea. Moves bowels around every 3 days.  Straining to move bowels with hard to loose/liquid stools.  Hemorrhoids are bleeding on tissue paper.  States she thinks Linzess 72 mcg gave her diarrhea in the past.  States she has a history of ulcerative colitis.  Is afraid of food.  Eating broth right now.   Has acid reflux and will vomit food.  Has severe nausea.  Taking nothing for acid reflux.  Still taking prn bentyl.   Did not notice much of a difference with antibiotics from diverticulitis.  States she worries about everything and has recently suffered a loss.  Lots of anxiety/stress.  Is afraid to try anything.  No she should get out of the house more often for her health, but bowels issues are making her fearful.  Is supposed to seek mental health therapy and plans to.  Denies melena, hematochezia, dysphagia, unintentional weight loss    Denies ETOH, smoking 3-4 cigarettes daily, denies marijuana  Denies fxh GI cancer or IBD  Abdominal Surgeries: denies    Last colon[place]- [place]- diverticulosis, no polyps    1/2005 flex sig for diarrhea: Chronic colitis sigmoid and rectum.  Pathology positive  cryptitis/possible UC      Past Medical History  As per HPI.     Social History  she  reports that she has never smoked. She has never used smokeless tobacco. She reports that she does not drink alcohol and does not use drugs.     Family History  her family history is not on file.     Review of Systems  Review of Systems   Constitutional:  Negative for appetite change, chills, fatigue and fever.   HENT:  Negative for trouble swallowing.    Respiratory:  Negative for cough and shortness of breath.    Gastrointestinal:  Positive for abdominal distention, abdominal pain (LLQ), anal bleeding, constipation and diarrhea. Negative for blood in stool, nausea, rectal pain and vomiting.   Psychiatric/Behavioral:  The patient is nervous/anxious.        Allergies  Allergies   Allergen Reactions    Aspirin Anaphylaxis    Bee Venom Protein (Honey Bee) Anaphylaxis    Demerol [Meperidine] Anaphylaxis    Iodine Anaphylaxis    Penicillins Anaphylaxis    Sulfa (Sulfonamide Antibiotics) Anaphylaxis    Codeine Unknown       Medications  Current Outpatient Medications   Medication Instructions    albuterol (Ventolin HFA) 90 mcg/actuation inhaler 2 puffs, inhalation, Every 4 hours PRN    clonazePAM (KLONOPIN) 0.5 mg, oral, 3 times daily    dicyclomine (BENTYL) 20 mg, oral, 4 times daily before meals and nightly    fluticasone (Flonase) 50 mcg/actuation nasal spray 1 spray, Each Nostril, Daily, Shake gently. Before first use, prime pump. After use, clean tip and replace cap.    fluticasone (Flonase) 50 mcg/actuation nasal spray nasal    lubiprostone (AMITIZA) 8 mcg, oral, 2 times daily (morning and late afternoon), Take with meals    magnesium citrate solution 296 mL, oral, Once    ondansetron (Zofran) 4 mg tablet Take 1 tablet (4 mg) by mouth every 8 hours as needed for nausea    pantoprazole (PROTONIX) 40 mg, oral, Daily, Do not crush, chew, or split.    polyethylene glycol (Golytely) 236-22.74-6.74 -5.86 gram solution 4,000 mL, oral,  Once, Drink 1/2 starting at 6:00 pm the night prior to the procedure and the other 1/2 5 hours prior to the procedure.        Objective   There were no vitals taken for this visit.     Physical Exam      Lab Results   Component Value Date    WBC 11.8 (H) 03/18/2024    WBC 10.3 11/21/2023    HGB 12.7 03/18/2024    HGB 14.5 11/21/2023    HCT 37.3 03/18/2024    HCT 46.7 (H) 11/21/2023     03/18/2024     11/21/2023     Lab Results   Component Value Date     03/18/2024     11/21/2023    K 4.9 03/18/2024    K 4.9 11/21/2023     03/18/2024     11/21/2023    CO2 25 03/18/2024    CO2 29 11/21/2023    BUN 13 03/18/2024    BUN 17 11/21/2023    CREATININE 0.76 03/18/2024    CREATININE 0.84 11/21/2023    CALCIUM 9.5 03/18/2024    CALCIUM 10.4 11/21/2023    PROT 7.1 03/18/2024    PROT 7.4 11/21/2023    BILITOT 0.6 03/18/2024    BILITOT 0.9 11/21/2023    ALKPHOS 88 03/18/2024    ALKPHOS 84 11/21/2023    ALT 5 (L) 03/18/2024    ALT 13 11/21/2023    AST 17 03/18/2024    AST 20 11/21/2023    GLUCOSE 99 03/18/2024    GLUCOSE 97 11/21/2023           Elida Yoon is a 79 y.o. female who presents to GI clinic for rectal bleeding, constipation vs diarrhea, GERD.    Rectal bleeding  Patient reports history of ulcerative colitis and bleeding hemorrhoids  -Schedule colonoscopy  -Follow-up 3 weeks postprocedure    LUQ pain  Consider gastritis, esophagitis, duodenitis, PUD  -Start 40 mg Protonix daily  -Schedule EGD    Irritable bowel syndrome  -Start low-dose Amitiza  -Consider Trulance  -Consider desipramine  -Recommended patient discuss mental health management with PCP             Ashley Gonzalez APRN-CNP

## 2025-03-14 NOTE — ASSESSMENT & PLAN NOTE
-Start low-dose Amitiza  -Consider Trulance  -Consider desipramine  -Recommended patient discuss mental health management with PCP

## 2025-03-14 NOTE — PATIENT INSTRUCTIONS
Cleanse bowels with Magnesium citrate, then 2 days later start Amitiza.  Please take Pantoprazole 30-60 minutes before a meal every morning daily.  This is to help calm stomach acid.    Please schedule your upper endoscopy and colonoscopy. You will need a ride since this involves sedation.  I will send a bowel prep to your pharmacy.  Clear liquid diet the day before the procedure. Start the 1st part of the prep at 6 pm the night prior and the other half 5 hrs before the procedure.    Please follow up 3 weeks post procedure

## 2025-03-14 NOTE — ASSESSMENT & PLAN NOTE
Patient reports history of ulcerative colitis and bleeding hemorrhoids  -Schedule colonoscopy  -Follow-up 3 weeks postprocedure

## 2025-03-15 LAB
1OH-MIDAZOLAM UR-MCNC: NORMAL NG/ML
7AMINOCLONAZEPAM UR-MCNC: NORMAL NG/ML
A-OH ALPRAZ UR-MCNC: NORMAL NG/ML
A-OH-TRIAZOLAM UR-MCNC: NORMAL NG/ML
ALBUMIN SERPL-MCNC: 4.8 G/DL (ref 3.6–5.1)
ALP SERPL-CCNC: 92 U/L (ref 37–153)
ALT SERPL-CCNC: 8 U/L (ref 6–29)
AMPHETAMINES UR QL: NEGATIVE NG/ML
ANION GAP SERPL CALCULATED.4IONS-SCNC: 12 MMOL/L (CALC) (ref 7–17)
AST SERPL-CCNC: 15 U/L (ref 10–35)
BARBITURATES UR QL: NEGATIVE NG/ML
BASOPHILS # BLD AUTO: 68 CELLS/UL (ref 0–200)
BASOPHILS NFR BLD AUTO: 0.7 %
BILIRUB SERPL-MCNC: 0.8 MG/DL (ref 0.2–1.2)
BUN SERPL-MCNC: 14 MG/DL (ref 7–25)
BZE UR QL: NEGATIVE NG/ML
CALCIUM SERPL-MCNC: 9.7 MG/DL (ref 8.6–10.4)
CHLORIDE SERPL-SCNC: 99 MMOL/L (ref 98–110)
CHOLEST SERPL-MCNC: 314 MG/DL
CHOLEST/HDLC SERPL: 3.7 (CALC)
CO2 SERPL-SCNC: 29 MMOL/L (ref 20–32)
CODEINE UR-MCNC: NORMAL NG/ML
CREAT SERPL-MCNC: 0.73 MG/DL (ref 0.6–1)
CREAT UR-MCNC: 73.6 MG/DL
DRUG SCREEN COMMENT UR-IMP: NORMAL
EDDP UR-MCNC: NORMAL NG/ML
EGFRCR SERPLBLD CKD-EPI 2021: 84 ML/MIN/1.73M2
EOSINOPHIL # BLD AUTO: 97 CELLS/UL (ref 15–500)
EOSINOPHIL NFR BLD AUTO: 1 %
ERYTHROCYTE [DISTWIDTH] IN BLOOD BY AUTOMATED COUNT: 13.1 % (ref 11–15)
FENTANYL UR-MCNC: NORMAL NG/ML
GLUCOSE SERPL-MCNC: 95 MG/DL (ref 65–99)
HCT VFR BLD AUTO: 44.1 % (ref 35–45)
HDLC SERPL-MCNC: 86 MG/DL
HGB BLD-MCNC: 14.4 G/DL (ref 11.7–15.5)
HYDROCODONE UR-MCNC: NORMAL UG/ML
HYDROMORPHONE UR-MCNC: NORMAL NG/ML
LDLC SERPL CALC-MCNC: 206 MG/DL (CALC)
LORAZEPAM UR-MCNC: NORMAL NG/ML
LYMPHOCYTES # BLD AUTO: 2212 CELLS/UL (ref 850–3900)
LYMPHOCYTES NFR BLD AUTO: 22.8 %
MCH RBC QN AUTO: 30.1 PG (ref 27–33)
MCHC RBC AUTO-ENTMCNC: 32.7 G/DL (ref 32–36)
MCV RBC AUTO: 92.3 FL (ref 80–100)
METHADONE UR-MCNC: NORMAL UG/L
MONOCYTES # BLD AUTO: 669 CELLS/UL (ref 200–950)
MONOCYTES NFR BLD AUTO: 6.9 %
MORPHINE UR-MCNC: NORMAL NG/ML
NEUTROPHILS # BLD AUTO: 6654 CELLS/UL (ref 1500–7800)
NEUTROPHILS NFR BLD AUTO: 68.6 %
NONHDLC SERPL-MCNC: 228 MG/DL (CALC)
NORDIAZEPAM UR-MCNC: NORMAL NG/ML
NORFENTANYL UR-MCNC: NORMAL NG/ML
NORHYDROCODONE UR CFM-MCNC: NORMAL NG/ML
NOROXYCODONE UR CFM-MCNC: NORMAL NG/ML
NORTRAMADOL UR-MCNC: NORMAL UG/ML
OH-ETHYLFLURAZ UR-MCNC: NORMAL NG/ML
OXAZEPAM UR-MCNC: NORMAL UG/ML
OXIDANTS UR QL: NEGATIVE MCG/ML
OXYCODONE UR CFM-MCNC: NORMAL NG/ML
OXYMORPHONE UR CFM-MCNC: NORMAL NG/ML
PCP UR QL: NEGATIVE NG/ML
PH UR: 6.3 [PH] (ref 4.5–9)
PLATELET # BLD AUTO: 330 THOUSAND/UL (ref 140–400)
PMV BLD REES-ECKER: 11.4 FL (ref 7.5–12.5)
POTASSIUM SERPL-SCNC: 4.4 MMOL/L (ref 3.5–5.3)
PROT SERPL-MCNC: 7.1 G/DL (ref 6.1–8.1)
QUEST 6 ACETYLMORPHINE: NORMAL
QUEST NOTES AND COMMENTS: NORMAL
QUEST PATIENT HISTORICAL REPORT: NORMAL
QUEST ZOLPIDEM: NORMAL
RBC # BLD AUTO: 4.78 MILLION/UL (ref 3.8–5.1)
SODIUM SERPL-SCNC: 140 MMOL/L (ref 135–146)
T4 FREE SERPL-MCNC: 0.9 NG/DL (ref 0.8–1.8)
TEMAZEPAM UR-MCNC: NORMAL NG/ML
THC UR QL: NEGATIVE NG/ML
TRAMADOL UR-MCNC: NORMAL UG/ML
TRIGL SERPL-MCNC: 96 MG/DL
TSH SERPL-ACNC: 6.9 MIU/L (ref 0.4–4.5)
WBC # BLD AUTO: 9.7 THOUSAND/UL (ref 3.8–10.8)
ZOLPIDEM PHENYL-4-CARB UR CFM-MCNC: NORMAL NG/ML

## 2025-03-17 ENCOUNTER — PATIENT OUTREACH (OUTPATIENT)
Dept: PRIMARY CARE | Facility: CLINIC | Age: 80
End: 2025-03-17
Payer: MEDICARE

## 2025-03-17 DIAGNOSIS — Z12.11 COLON CANCER SCREENING: ICD-10-CM

## 2025-03-17 RX ORDER — SODIUM, POTASSIUM,MAG SULFATES 17.5-3.13G
SOLUTION, RECONSTITUTED, ORAL ORAL
Qty: 1 EACH | Refills: 0 | Status: SHIPPED | OUTPATIENT
Start: 2025-03-17

## 2025-03-17 NOTE — PROGRESS NOTES
Attempted to contact patient for CCM monthly outreach. Unable to leave a voicemail. CM will attempt again at a later time.

## 2025-03-18 LAB
1OH-MIDAZOLAM UR-MCNC: NEGATIVE NG/ML
7AMINOCLONAZEPAM UR-MCNC: 465 NG/ML
A-OH ALPRAZ UR-MCNC: NEGATIVE NG/ML
A-OH-TRIAZOLAM UR-MCNC: NEGATIVE NG/ML
AMPHETAMINES UR QL: NEGATIVE NG/ML
BARBITURATES UR QL: NEGATIVE NG/ML
BZE UR QL: NEGATIVE NG/ML
CODEINE UR-MCNC: NEGATIVE NG/ML
CREAT UR-MCNC: 73.6 MG/DL
DRUG SCREEN COMMENT UR-IMP: ABNORMAL
EDDP UR-MCNC: NEGATIVE NG/ML
FENTANYL UR-MCNC: NEGATIVE NG/ML
HYDROCODONE UR-MCNC: NEGATIVE NG/ML
HYDROMORPHONE UR-MCNC: NEGATIVE NG/ML
LORAZEPAM UR-MCNC: NEGATIVE NG/ML
METHADONE UR-MCNC: NEGATIVE NG/ML
MORPHINE UR-MCNC: NEGATIVE NG/ML
NORDIAZEPAM UR-MCNC: NEGATIVE NG/ML
NORFENTANYL UR-MCNC: NEGATIVE NG/ML
NORHYDROCODONE UR CFM-MCNC: NEGATIVE NG/ML
NOROXYCODONE UR CFM-MCNC: NEGATIVE NG/ML
NORTRAMADOL UR-MCNC: NEGATIVE NG/ML
OH-ETHYLFLURAZ UR-MCNC: NEGATIVE NG/ML
OXAZEPAM UR-MCNC: NEGATIVE NG/ML
OXIDANTS UR QL: NEGATIVE MCG/ML
OXYCODONE UR CFM-MCNC: NEGATIVE NG/ML
OXYMORPHONE UR CFM-MCNC: NEGATIVE NG/ML
PCP UR QL: NEGATIVE NG/ML
PH UR: 6.3 [PH] (ref 4.5–9)
QUEST 6 ACETYLMORPHINE: NEGATIVE NG/ML
QUEST NOTES AND COMMENTS: ABNORMAL
QUEST ZOLPIDEM: NEGATIVE NG/ML
TEMAZEPAM UR-MCNC: NEGATIVE NG/ML
THC UR QL: NEGATIVE NG/ML
TRAMADOL UR-MCNC: NEGATIVE NG/ML
ZOLPIDEM PHENYL-4-CARB UR CFM-MCNC: NEGATIVE NG/ML

## 2025-03-19 ENCOUNTER — PATIENT OUTREACH (OUTPATIENT)
Dept: PRIMARY CARE | Facility: CLINIC | Age: 80
End: 2025-03-19
Payer: MEDICARE

## 2025-03-19 DIAGNOSIS — K51.919 ULCERATIVE COLITIS WITH COMPLICATION, UNSPECIFIED LOCATION (MULTI): ICD-10-CM

## 2025-03-19 DIAGNOSIS — G43.109 MIGRAINE WITH AURA AND WITHOUT STATUS MIGRAINOSUS, NOT INTRACTABLE: ICD-10-CM

## 2025-03-19 NOTE — PROGRESS NOTES
Spoke with patient for monthly CCM outreach. Chart reviewed. Introduced self. Identified by name and . Confirmed insurance coverage.  Doing well. Sounds very positive today.  Scheduled for colonoscopy/EGD in April.  Has not had any migraines.   Having intestinal issues-with vomiting and diarrhea with occasional blood. Issues have resolved.  Would like to know if there is someone she could get to assist after her procedure if needed. CM recommended [employer] as a resource.  All questions and concerns addressed at this time.  Encouraged to call with questions, concerns or needs.

## 2025-04-23 ENCOUNTER — APPOINTMENT (OUTPATIENT)
Dept: GASTROENTEROLOGY | Facility: EXTERNAL LOCATION | Age: 80
End: 2025-04-23
Payer: MEDICARE

## 2025-04-23 DIAGNOSIS — Z12.11 SPECIAL SCREENING FOR MALIGNANT NEOPLASMS, COLON: ICD-10-CM

## 2025-04-23 DIAGNOSIS — Z87.19 HISTORY OF COLITIS: ICD-10-CM

## 2025-04-23 DIAGNOSIS — R11.10 HABIT VOMITING: ICD-10-CM

## 2025-04-23 DIAGNOSIS — K29.30 SUPERFICIAL GASTRITIS WITHOUT HEMORRHAGE, UNSPECIFIED CHRONICITY: ICD-10-CM

## 2025-04-23 DIAGNOSIS — R11.0 NAUSEA: Primary | ICD-10-CM

## 2025-04-23 DIAGNOSIS — K62.5 RECTAL BLEEDING: ICD-10-CM

## 2025-04-23 DIAGNOSIS — K21.9 GASTROESOPHAGEAL REFLUX DISEASE, UNSPECIFIED WHETHER ESOPHAGITIS PRESENT: ICD-10-CM

## 2025-04-23 DIAGNOSIS — K44.9 HIATAL HERNIA: ICD-10-CM

## 2025-04-23 PROCEDURE — 88305 TISSUE EXAM BY PATHOLOGIST: CPT

## 2025-04-23 PROCEDURE — 45380 COLONOSCOPY AND BIOPSY: CPT | Performed by: INTERNAL MEDICINE

## 2025-04-23 PROCEDURE — 0753T DGTZ GLS MCRSCP SLD LEVEL IV: CPT

## 2025-04-23 PROCEDURE — 88305 TISSUE EXAM BY PATHOLOGIST: CPT | Performed by: PATHOLOGY

## 2025-04-23 PROCEDURE — 43239 EGD BIOPSY SINGLE/MULTIPLE: CPT | Performed by: INTERNAL MEDICINE

## 2025-04-25 ENCOUNTER — PATIENT OUTREACH (OUTPATIENT)
Dept: PRIMARY CARE | Facility: CLINIC | Age: 80
End: 2025-04-25
Payer: MEDICARE

## 2025-04-25 ENCOUNTER — LAB REQUISITION (OUTPATIENT)
Dept: LAB | Facility: HOSPITAL | Age: 80
End: 2025-04-25
Payer: MEDICARE

## 2025-04-25 ENCOUNTER — TELEPHONE (OUTPATIENT)
Dept: GASTROENTEROLOGY | Facility: CLINIC | Age: 80
End: 2025-04-25
Payer: MEDICARE

## 2025-04-25 DIAGNOSIS — K57.92 DIVERTICULITIS: ICD-10-CM

## 2025-04-25 DIAGNOSIS — K62.5 HEMORRHAGE OF ANUS AND RECTUM: ICD-10-CM

## 2025-04-25 DIAGNOSIS — N30.00 ACUTE CYSTITIS WITHOUT HEMATURIA: Primary | ICD-10-CM

## 2025-04-25 DIAGNOSIS — K21.9 GASTRO-ESOPHAGEAL REFLUX DISEASE WITHOUT ESOPHAGITIS: ICD-10-CM

## 2025-04-25 DIAGNOSIS — E78.00 PURE HYPERCHOLESTEROLEMIA: ICD-10-CM

## 2025-04-25 RX ORDER — NITROFURANTOIN 25; 75 MG/1; MG/1
100 CAPSULE ORAL 2 TIMES DAILY
Qty: 14 CAPSULE | Refills: 0 | Status: SHIPPED | OUTPATIENT
Start: 2025-04-25 | End: 2025-05-02

## 2025-04-25 NOTE — TELEPHONE ENCOUNTER
Urinary frequency and irritation since colonoscopy. Hx frequent UTI.  Prescribed Macrobid x 7 days.

## 2025-04-25 NOTE — PROGRESS NOTES
Spoke with patient for monthly CCM outreach. Chart reviewed. Introduced self. Identified by name and . Confirmed insurance coverage.  Doing well.  Still recovering from colonoscopy and EGD performed this week. +diverticulitis and hiatal hernia.  Started ATB due to urine frequency and irritation after procedure.  Having abdominal cramps, taking Bentyl, with relief.  Confirmed appointment change and new date and  time given.   Encouraged to call with questions, concerns or needs.

## 2025-05-05 LAB
LABORATORY COMMENT REPORT: NORMAL
PATH REPORT.FINAL DX SPEC: NORMAL
PATH REPORT.GROSS SPEC: NORMAL
PATH REPORT.RELEVANT HX SPEC: NORMAL
PATH REPORT.TOTAL CANCER: NORMAL

## 2025-05-05 NOTE — RESULT ENCOUNTER NOTE
Gastric biopsies -- reactive gastropathy.  Clinically harmless.    Colon biopsies -- normal.  No evidence of microscopic colitis or past inflammatory bowel disease.    Follow-up as needed.

## 2025-05-20 ENCOUNTER — PATIENT OUTREACH (OUTPATIENT)
Dept: PRIMARY CARE | Facility: CLINIC | Age: 80
End: 2025-05-20
Payer: MEDICARE

## 2025-05-20 DIAGNOSIS — M79.7 FIBROMYALGIA: ICD-10-CM

## 2025-05-20 DIAGNOSIS — E78.00 PURE HYPERCHOLESTEROLEMIA: ICD-10-CM

## 2025-05-20 NOTE — PROGRESS NOTES
Care Management Monthly Outreach  Chart review completed  Confirmation of at least 2 patient identifiers  Change in insurance? No    Has patient been to ER/Urgent Care since last outreach? No    Last Office Visit with PCP: 3/12/2025   Next Office Visit with PCP: 7/3/2025   APC Collaboration: n/a    Chronic Conditions and Outreach Summary:   Pure hypercholesterolemia    Fibromyalgia    Spoke with patient for monthly CCM outreach.  Doing well.  On her way to an appointment.    Medications:   Are there medication changes since last visit? No  Refills needed? No    Social Drivers of Health: Deferred  Care Gaps Addressed? Deferred  Care Plan addressed: Yes    Upcoming Appointments:   Future Appointments       Date / Time Provider Department Dept Phone    7/3/2025 9:40 AM (Arrive by 9:25 AM) Diana Jc DO  [facility] [phone]          Blood Pressures Reviewed  BP Readings from Last 3 Encounters:   03/12/25 117/71   01/17/25 135/73   09/18/24 135/85       No other concerns at this time.  Agreeable to continue monthly outreaches.  Encouraged to call if questions or concerns arise.    MICAH MORALES

## 2025-05-21 ENCOUNTER — TELEPHONE (OUTPATIENT)
Dept: GASTROENTEROLOGY | Facility: CLINIC | Age: 80
End: 2025-05-21
Payer: MEDICARE

## 2025-05-21 DIAGNOSIS — R19.7 DIARRHEA, UNSPECIFIED TYPE: Primary | ICD-10-CM

## 2025-05-21 RX ORDER — DIPHENOXYLATE HYDROCHLORIDE AND ATROPINE SULFATE 2.5; .025 MG/1; MG/1
1 TABLET ORAL 2 TIMES DAILY PRN
Qty: 20 TABLET | Refills: 0 | Status: SHIPPED | OUTPATIENT
Start: 2025-05-21 | End: 2025-05-31

## 2025-05-21 NOTE — TELEPHONE ENCOUNTER
Has been having diarrhea off and on for 3 weeks now.  Started after colonoscopy.  Also had Macrodantin for UTI.  Has had episodes of incontinence.  Checking C. difficile, pathogen panel, lactoferrin.  Also prescribing Lomotil.  She has taken before without difficulty and understands risks and issues to them does not appear at risk of abuse.

## 2025-06-03 ENCOUNTER — APPOINTMENT (OUTPATIENT)
Dept: PRIMARY CARE | Facility: CLINIC | Age: 80
End: 2025-06-03
Payer: MEDICARE

## 2025-06-14 DIAGNOSIS — F41.9 ANXIETY: ICD-10-CM

## 2025-06-14 RX ORDER — CLONAZEPAM 0.5 MG/1
0.5 TABLET ORAL 3 TIMES DAILY
Qty: 30 TABLET | Refills: 0 | Status: SHIPPED | OUTPATIENT
Start: 2025-06-14 | End: 2025-06-24

## 2025-06-14 NOTE — PROGRESS NOTES
On-call physician note-patient called answering service twice asking for refill on Klonopin which she takes 3 times a day.  She states that Dr. Quintana her PCP prescribes it to her to take 3 times a day for 3 months.  She is running out of refill tomorrow.  [place] was reviewed-patient needs to discuss with her PCP and also follow-up with psychiatry.  Plan-Klonopin-10-day prescription given until she sees her PCP.

## 2025-06-23 ENCOUNTER — PATIENT OUTREACH (OUTPATIENT)
Dept: PRIMARY CARE | Facility: CLINIC | Age: 80
End: 2025-06-23
Payer: MEDICARE

## 2025-06-23 DIAGNOSIS — M81.0 OSTEOPOROSIS, UNSPECIFIED OSTEOPOROSIS TYPE, UNSPECIFIED PATHOLOGICAL FRACTURE PRESENCE: ICD-10-CM

## 2025-06-23 DIAGNOSIS — G43.109 MIGRAINE WITH AURA AND WITHOUT STATUS MIGRAINOSUS, NOT INTRACTABLE: ICD-10-CM

## 2025-06-23 NOTE — PROGRESS NOTES
Care Management Monthly Outreach  Chart review completed  Confirmation of at least 2 patient identifiers  Change in insurance? No    Has patient been to ER/Urgent Care since last outreach? No    MCW: 9/2024  Last Office Visit with PCP: 3/12/2025   Next Office Visit with PCP: 7/3/2025   APC Collaboration: n/a    Chronic Conditions and Outreach Summary:   Migraine with aura and without status migrainosus, not intractable    Osteoporosis, unspecified osteoporosis type, unspecified pathological fracture presence    Spoke with patient for monthly CCM outreach.  Doing well.  Has increased water intake.   Has had a few migraines. Takes Tylenol and uses ice.  Due for eye exam. Will call to schedule.  Discussed obtaining bone density. Agreeable.    Medications:   Are there medication changes since last visit? no  Refills needed? No    Social Drivers of Health: Deferred  Care Gaps Addressed? Addressed today  Care Plan addressed: Yes    Upcoming Appointments:   Future Appointments       Date / Time Provider Department Dept Phone    7/3/2025 9:40 AM (Arrive by 9:25 AM) Diana Jc DO  [facility] [phone]          Blood Pressures Reviewed  BP Readings from Last 3 Encounters:   03/12/25 117/71   01/17/25 135/73   09/18/24 135/85     No other concerns at this time.  Agreeable to continue monthly outreaches.  Encouraged to call if questions or concerns arise.    MICAH MORALES

## 2025-07-03 ENCOUNTER — APPOINTMENT (OUTPATIENT)
Dept: PRIMARY CARE | Facility: CLINIC | Age: 80
End: 2025-07-03
Payer: MEDICARE

## 2025-07-03 VITALS
DIASTOLIC BLOOD PRESSURE: 76 MMHG | HEART RATE: 67 BPM | OXYGEN SATURATION: 95 % | BODY MASS INDEX: 21.79 KG/M2 | WEIGHT: 123 LBS | SYSTOLIC BLOOD PRESSURE: 125 MMHG

## 2025-07-03 DIAGNOSIS — Z87.892 HISTORY OF ANAPHYLAXIS: ICD-10-CM

## 2025-07-03 DIAGNOSIS — Z00.00 ENCOUNTER FOR PREVENTATIVE ADULT HEALTH CARE EXAMINATION: ICD-10-CM

## 2025-07-03 DIAGNOSIS — Z13.5 SCREENING FOR EYE CONDITION: ICD-10-CM

## 2025-07-03 DIAGNOSIS — K58.1 IRRITABLE BOWEL SYNDROME WITH CONSTIPATION: ICD-10-CM

## 2025-07-03 DIAGNOSIS — F41.9 ANXIETY: ICD-10-CM

## 2025-07-03 DIAGNOSIS — Z12.31 SCREENING MAMMOGRAM FOR BREAST CANCER: ICD-10-CM

## 2025-07-03 DIAGNOSIS — F17.200 TOBACCO USE DISORDER: ICD-10-CM

## 2025-07-03 DIAGNOSIS — E78.00 PURE HYPERCHOLESTEROLEMIA: ICD-10-CM

## 2025-07-03 DIAGNOSIS — F33.1 MODERATE EPISODE OF RECURRENT MAJOR DEPRESSIVE DISORDER: ICD-10-CM

## 2025-07-03 DIAGNOSIS — E55.9 VITAMIN D DEFICIENCY: ICD-10-CM

## 2025-07-03 DIAGNOSIS — R06.2 WHEEZING: ICD-10-CM

## 2025-07-03 DIAGNOSIS — N95.2 VAGINAL ATROPHY: ICD-10-CM

## 2025-07-03 DIAGNOSIS — Z00.00 ROUTINE GENERAL MEDICAL EXAMINATION AT HEALTH CARE FACILITY: Primary | ICD-10-CM

## 2025-07-03 PROCEDURE — 1159F MED LIST DOCD IN RCRD: CPT | Performed by: INTERNAL MEDICINE

## 2025-07-03 PROCEDURE — 1170F FXNL STATUS ASSESSED: CPT | Performed by: INTERNAL MEDICINE

## 2025-07-03 PROCEDURE — 99214 OFFICE O/P EST MOD 30 MIN: CPT | Performed by: INTERNAL MEDICINE

## 2025-07-03 PROCEDURE — 1123F ACP DISCUSS/DSCN MKR DOCD: CPT | Performed by: INTERNAL MEDICINE

## 2025-07-03 PROCEDURE — 99397 PER PM REEVAL EST PAT 65+ YR: CPT | Performed by: INTERNAL MEDICINE

## 2025-07-03 PROCEDURE — 1160F RVW MEDS BY RX/DR IN RCRD: CPT | Performed by: INTERNAL MEDICINE

## 2025-07-03 PROCEDURE — G0439 PPPS, SUBSEQ VISIT: HCPCS | Performed by: INTERNAL MEDICINE

## 2025-07-03 PROCEDURE — 1158F ADVNC CARE PLAN TLK DOCD: CPT | Performed by: INTERNAL MEDICINE

## 2025-07-03 PROCEDURE — 1126F AMNT PAIN NOTED NONE PRSNT: CPT | Performed by: INTERNAL MEDICINE

## 2025-07-03 RX ORDER — UMECLIDINIUM BROMIDE AND VILANTEROL TRIFENATATE 62.5; 25 UG/1; UG/1
1 POWDER RESPIRATORY (INHALATION) DAILY
Qty: 60 EACH | Refills: 1 | Status: SHIPPED | OUTPATIENT
Start: 2025-07-03

## 2025-07-03 RX ORDER — ESTRADIOL 0.1 MG/G
2 CREAM VAGINAL DAILY
Qty: 42.5 G | Refills: 5 | Status: SHIPPED | OUTPATIENT
Start: 2025-07-03 | End: 2026-07-03

## 2025-07-03 RX ORDER — EPINEPHRINE 0.3 MG/.3ML
1 INJECTION SUBCUTANEOUS AS NEEDED
Qty: 2 EACH | Refills: 0 | Status: SHIPPED | OUTPATIENT
Start: 2025-07-03 | End: 2026-07-03

## 2025-07-03 RX ORDER — CLONAZEPAM 0.5 MG/1
0.5 TABLET ORAL 3 TIMES DAILY
Qty: 90 TABLET | Refills: 3 | Status: SHIPPED | OUTPATIENT
Start: 2025-07-03 | End: 2025-10-31

## 2025-07-03 RX ORDER — ACETAMINOPHEN 500 MG
100 TABLET ORAL DAILY
Qty: 60 CAPSULE | Refills: 11 | Status: SHIPPED | OUTPATIENT
Start: 2025-07-03 | End: 2026-07-03

## 2025-07-03 ASSESSMENT — ACTIVITIES OF DAILY LIVING (ADL)
DRESSING: INDEPENDENT
DOING_HOUSEWORK: INDEPENDENT
GROCERY_SHOPPING: INDEPENDENT
TAKING_MEDICATION: INDEPENDENT
MANAGING_FINANCES: INDEPENDENT
BATHING: INDEPENDENT

## 2025-07-03 ASSESSMENT — ENCOUNTER SYMPTOMS
DEPRESSION: 0
OCCASIONAL FEELINGS OF UNSTEADINESS: 0
LOSS OF SENSATION IN FEET: 0

## 2025-07-03 ASSESSMENT — PATIENT HEALTH QUESTIONNAIRE - PHQ9
2. FEELING DOWN, DEPRESSED OR HOPELESS: NOT AT ALL
SUM OF ALL RESPONSES TO PHQ9 QUESTIONS 1 AND 2: 0
1. LITTLE INTEREST OR PLEASURE IN DOING THINGS: NOT AT ALL

## 2025-07-03 ASSESSMENT — PAIN SCALES - GENERAL: PAINLEVEL_OUTOF10: 0-NO PAIN
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
## 2025-07-03 NOTE — ASSESSMENT & PLAN NOTE
Orders:    clonazePAM (KlonoPIN) 0.5 mg tablet; Take 1 tablet (0.5 mg) by mouth 3 times a day.

## 2025-07-03 NOTE — PATIENT INSTRUCTIONS
Elida,   Constipation - take AMITIZA (lubiprostone). Add fiber (2 kiwis or  4 prunes per day). Increase water intake.   RSV shot at the pharmacy   Take 2000 units of Vitamin D3 daily.  Send me a note in a month to see if you are ready to start a medication called REPATHA to manage cholesterol.   Gynecology - I recommend Dr. Julien Phoenix, Dr. Meeta Ayala ([phone]), Dr. Radha Castañeda ([phone])  or Dr. Shruthi Gerber ([phone]).   Vaginal estrogen prescribed   Epi Pen prescribed for anaphylaxis   Mammogram - Call [phone] to have this scheduled.    Ophthalmology referral   Followup 3 months

## 2025-07-15 ENCOUNTER — PATIENT OUTREACH (OUTPATIENT)
Dept: PRIMARY CARE | Facility: CLINIC | Age: 80
End: 2025-07-15
Payer: MEDICARE

## 2025-07-15 DIAGNOSIS — M81.0 OSTEOPOROSIS, UNSPECIFIED OSTEOPOROSIS TYPE, UNSPECIFIED PATHOLOGICAL FRACTURE PRESENCE: ICD-10-CM

## 2025-07-15 DIAGNOSIS — F41.9 ANXIETY: ICD-10-CM

## 2025-07-15 NOTE — PROGRESS NOTES
Care Management Monthly Outreach  Chart review completed  Confirmation of at least 2 patient identifiers  Change in insurance? No    Has patient been to ER/Urgent Care since last outreach? No    MCW: 7/2025  Last Office Visit with PCP: 7/3/2025   Next Office Visit with PCP: 10/21/2025   APC Collaboration: n/a    Chronic Conditions and Outreach Summary:   Osteoporosis, unspecified osteoporosis type, unspecified pathological fracture presence    Anxiety    Spoke with patient for monthly CCM outreach.  Doing well.  Recent primary provider appointment. Mammogram ordered. CM will assist with scheduling at preferred location. patient will schedule eye exam at local optometry.   Spoke to patient about volunteering at pet shelter. States she will want to adopt a dog and it is not financially sound of her to get another pet.     Medications:   Are there medication changes since last visit? No  Refills needed? No    Social Drivers of Health: Addressed in the last 6 months  Care Gaps Addressed? Addressed today  Care Plan addressed: Yes    Upcoming Appointments:   Future Appointments       Date / Time Provider Department Dept Phone    10/21/2025 9:40 AM (Arrive by 9:25 AM) Diana Jc DO  [facility] [phone]          Blood Pressures Reviewed  BP Readings from Last 3 Encounters:   07/03/25 125/76   03/12/25 117/71   01/17/25 135/73     No other concerns at this time.  Agreeable to continue monthly outreaches.  Encouraged to call if questions or concerns arise.    MICAH MORALES

## 2025-08-06 ENCOUNTER — TELEPHONE (OUTPATIENT)
Dept: PRIMARY CARE | Facility: CLINIC | Age: 80
End: 2025-08-06

## 2025-08-11 DIAGNOSIS — K57.92 DIVERTICULITIS: ICD-10-CM

## 2025-08-12 RX ORDER — DICYCLOMINE HYDROCHLORIDE 20 MG/1
20 TABLET ORAL
Qty: 120 TABLET | Refills: 1 | Status: SHIPPED | OUTPATIENT
Start: 2025-08-12 | End: 2026-08-12

## 2025-08-18 ENCOUNTER — PATIENT OUTREACH (OUTPATIENT)
Dept: PRIMARY CARE | Facility: CLINIC | Age: 80
End: 2025-08-18
Payer: MEDICARE

## 2025-08-18 DIAGNOSIS — M81.0 OSTEOPOROSIS, UNSPECIFIED OSTEOPOROSIS TYPE, UNSPECIFIED PATHOLOGICAL FRACTURE PRESENCE: ICD-10-CM

## 2025-08-18 DIAGNOSIS — E78.00 PURE HYPERCHOLESTEROLEMIA: ICD-10-CM

## 2025-10-21 ENCOUNTER — APPOINTMENT (OUTPATIENT)
Dept: PRIMARY CARE | Facility: CLINIC | Age: 80
End: 2025-10-21
Payer: MEDICARE